# Patient Record
Sex: FEMALE | Race: BLACK OR AFRICAN AMERICAN | Employment: UNEMPLOYED | URBAN - METROPOLITAN AREA
[De-identification: names, ages, dates, MRNs, and addresses within clinical notes are randomized per-mention and may not be internally consistent; named-entity substitution may affect disease eponyms.]

---

## 2017-06-04 ENCOUNTER — HOSPITAL ENCOUNTER (INPATIENT)
Age: 22
LOS: 4 days | Discharge: HOME | DRG: 885 | End: 2017-06-08
Attending: EMERGENCY MEDICINE | Admitting: PSYCHIATRY & NEUROLOGY
Payer: COMMERCIAL

## 2017-06-04 DIAGNOSIS — F30.9 MANIA (HCC): Primary | ICD-10-CM

## 2017-06-04 DIAGNOSIS — F12.90 MARIJUANA USE: ICD-10-CM

## 2017-06-04 PROBLEM — F31.2 BIPOLAR AFFECTIVE DISORDER, MANIC, SEVERE, WITH PSYCHOTIC BEHAVIOR (HCC): Status: ACTIVE | Noted: 2017-06-04

## 2017-06-04 LAB
ALBUMIN SERPL BCP-MCNC: 4 G/DL (ref 3.5–5)
ALBUMIN/GLOB SERPL: 0.9 {RATIO} (ref 1.1–2.2)
ALP SERPL-CCNC: 96 U/L (ref 45–117)
ALT SERPL-CCNC: 17 U/L (ref 12–78)
AMPHET UR QL SCN: NEGATIVE
ANION GAP BLD CALC-SCNC: 12 MMOL/L (ref 5–15)
APAP SERPL-MCNC: <2 UG/ML (ref 10–30)
APPEARANCE UR: CLEAR
AST SERPL W P-5'-P-CCNC: 21 U/L (ref 15–37)
BACTERIA URNS QL MICRO: NEGATIVE /HPF
BARBITURATES UR QL SCN: NEGATIVE
BASOPHILS # BLD AUTO: 0 K/UL (ref 0–0.1)
BASOPHILS # BLD: 0 % (ref 0–1)
BENZODIAZ UR QL: NEGATIVE
BILIRUB SERPL-MCNC: 0.3 MG/DL (ref 0.2–1)
BILIRUB UR QL: NEGATIVE
BUN SERPL-MCNC: 7 MG/DL (ref 6–20)
BUN/CREAT SERPL: 10 (ref 12–20)
CALCIUM SERPL-MCNC: 9 MG/DL (ref 8.5–10.1)
CANNABINOIDS UR QL SCN: POSITIVE
CHLORIDE SERPL-SCNC: 103 MMOL/L (ref 97–108)
CO2 SERPL-SCNC: 25 MMOL/L (ref 21–32)
COCAINE UR QL SCN: NEGATIVE
COLOR UR: ABNORMAL
CREAT SERPL-MCNC: 0.67 MG/DL (ref 0.55–1.02)
DRUG SCRN COMMENT,DRGCM: ABNORMAL
EOSINOPHIL # BLD: 0 K/UL (ref 0–0.4)
EOSINOPHIL NFR BLD: 0 % (ref 0–7)
EPITH CASTS URNS QL MICRO: ABNORMAL /LPF
ERYTHROCYTE [DISTWIDTH] IN BLOOD BY AUTOMATED COUNT: 13.8 % (ref 11.5–14.5)
ETHANOL SERPL-MCNC: <10 MG/DL
GLOBULIN SER CALC-MCNC: 4.6 G/DL (ref 2–4)
GLUCOSE SERPL-MCNC: 94 MG/DL (ref 65–100)
GLUCOSE UR STRIP.AUTO-MCNC: NEGATIVE MG/DL
HCG SERPL QL: NEGATIVE
HCT VFR BLD AUTO: 37 % (ref 35–47)
HGB BLD-MCNC: 11.9 G/DL (ref 11.5–16)
HGB UR QL STRIP: NEGATIVE
KETONES UR QL STRIP.AUTO: NEGATIVE MG/DL
LEUKOCYTE ESTERASE UR QL STRIP.AUTO: ABNORMAL
LYMPHOCYTES # BLD AUTO: 30 % (ref 12–49)
LYMPHOCYTES # BLD: 3.2 K/UL (ref 0.8–3.5)
MCH RBC QN AUTO: 24.3 PG (ref 26–34)
MCHC RBC AUTO-ENTMCNC: 32.2 G/DL (ref 30–36.5)
MCV RBC AUTO: 75.7 FL (ref 80–99)
METHADONE UR QL: NEGATIVE
MONOCYTES # BLD: 0.7 K/UL (ref 0–1)
MONOCYTES NFR BLD AUTO: 7 % (ref 5–13)
NEUTS SEG # BLD: 6.5 K/UL (ref 1.8–8)
NEUTS SEG NFR BLD AUTO: 63 % (ref 32–75)
NITRITE UR QL STRIP.AUTO: NEGATIVE
OPIATES UR QL: NEGATIVE
PCP UR QL: NEGATIVE
PH UR STRIP: 6 [PH] (ref 5–8)
PLATELET # BLD AUTO: 354 K/UL (ref 150–400)
POTASSIUM SERPL-SCNC: 3.8 MMOL/L (ref 3.5–5.1)
PROT SERPL-MCNC: 8.6 G/DL (ref 6.4–8.2)
PROT UR STRIP-MCNC: NEGATIVE MG/DL
RBC # BLD AUTO: 4.89 M/UL (ref 3.8–5.2)
RBC #/AREA URNS HPF: ABNORMAL /HPF (ref 0–5)
SALICYLATES SERPL-MCNC: 1.8 MG/DL (ref 2.8–20)
SODIUM SERPL-SCNC: 140 MMOL/L (ref 136–145)
SP GR UR REFRACTOMETRY: <1.005 (ref 1–1.03)
UA: UC IF INDICATED,UAUC: ABNORMAL
UROBILINOGEN UR QL STRIP.AUTO: 0.2 EU/DL (ref 0.2–1)
WBC # BLD AUTO: 10.4 K/UL (ref 3.6–11)
WBC URNS QL MICRO: ABNORMAL /HPF (ref 0–4)

## 2017-06-04 PROCEDURE — 80307 DRUG TEST PRSMV CHEM ANLYZR: CPT | Performed by: EMERGENCY MEDICINE

## 2017-06-04 PROCEDURE — 80053 COMPREHEN METABOLIC PANEL: CPT | Performed by: EMERGENCY MEDICINE

## 2017-06-04 PROCEDURE — 74011250636 HC RX REV CODE- 250/636: Performed by: EMERGENCY MEDICINE

## 2017-06-04 PROCEDURE — 81001 URINALYSIS AUTO W/SCOPE: CPT | Performed by: EMERGENCY MEDICINE

## 2017-06-04 PROCEDURE — 87491 CHLMYD TRACH DNA AMP PROBE: CPT | Performed by: EMERGENCY MEDICINE

## 2017-06-04 PROCEDURE — 96360 HYDRATION IV INFUSION INIT: CPT

## 2017-06-04 PROCEDURE — 51701 INSERT BLADDER CATHETER: CPT

## 2017-06-04 PROCEDURE — 85025 COMPLETE CBC W/AUTO DIFF WBC: CPT | Performed by: EMERGENCY MEDICINE

## 2017-06-04 PROCEDURE — 65220000003 HC RM SEMIPRIVATE PSYCH

## 2017-06-04 PROCEDURE — 77030005563 HC CATH URETH INT MMGH -A

## 2017-06-04 PROCEDURE — 99285 EMERGENCY DEPT VISIT HI MDM: CPT

## 2017-06-04 PROCEDURE — 84703 CHORIONIC GONADOTROPIN ASSAY: CPT | Performed by: EMERGENCY MEDICINE

## 2017-06-04 PROCEDURE — 36415 COLL VENOUS BLD VENIPUNCTURE: CPT | Performed by: EMERGENCY MEDICINE

## 2017-06-04 RX ORDER — IBUPROFEN 200 MG
1 TABLET ORAL
Status: DISCONTINUED | OUTPATIENT
Start: 2017-06-04 | End: 2017-06-08 | Stop reason: HOSPADM

## 2017-06-04 RX ORDER — ACETAMINOPHEN 325 MG/1
650 TABLET ORAL
Status: DISCONTINUED | OUTPATIENT
Start: 2017-06-04 | End: 2017-06-08 | Stop reason: HOSPADM

## 2017-06-04 RX ORDER — ADHESIVE BANDAGE
30 BANDAGE TOPICAL DAILY PRN
Status: DISCONTINUED | OUTPATIENT
Start: 2017-06-04 | End: 2017-06-08 | Stop reason: HOSPADM

## 2017-06-04 RX ORDER — OLANZAPINE 5 MG/1
5 TABLET ORAL
Status: DISCONTINUED | OUTPATIENT
Start: 2017-06-04 | End: 2017-06-08 | Stop reason: HOSPADM

## 2017-06-04 RX ORDER — BENZTROPINE MESYLATE 1 MG/ML
2 INJECTION INTRAMUSCULAR; INTRAVENOUS
Status: DISCONTINUED | OUTPATIENT
Start: 2017-06-04 | End: 2017-06-08 | Stop reason: HOSPADM

## 2017-06-04 RX ORDER — BENZTROPINE MESYLATE 2 MG/1
2 TABLET ORAL
Status: DISCONTINUED | OUTPATIENT
Start: 2017-06-04 | End: 2017-06-08 | Stop reason: HOSPADM

## 2017-06-04 RX ORDER — LORAZEPAM 2 MG/ML
2 INJECTION INTRAMUSCULAR
Status: DISCONTINUED | OUTPATIENT
Start: 2017-06-04 | End: 2017-06-08 | Stop reason: HOSPADM

## 2017-06-04 RX ORDER — CLONIDINE HYDROCHLORIDE 0.1 MG/1
0.2 TABLET ORAL
Status: ACTIVE | OUTPATIENT
Start: 2017-06-04 | End: 2017-06-04

## 2017-06-04 RX ORDER — IBUPROFEN 400 MG/1
400 TABLET ORAL
Status: DISCONTINUED | OUTPATIENT
Start: 2017-06-04 | End: 2017-06-06

## 2017-06-04 RX ORDER — ZOLPIDEM TARTRATE 10 MG/1
10 TABLET ORAL
Status: DISCONTINUED | OUTPATIENT
Start: 2017-06-04 | End: 2017-06-08 | Stop reason: HOSPADM

## 2017-06-04 RX ORDER — LORAZEPAM 1 MG/1
1 TABLET ORAL
Status: DISCONTINUED | OUTPATIENT
Start: 2017-06-04 | End: 2017-06-08 | Stop reason: HOSPADM

## 2017-06-04 RX ADMIN — SODIUM CHLORIDE 1000 ML: 900 INJECTION, SOLUTION INTRAVENOUS at 09:02

## 2017-06-04 NOTE — IP AVS SNAPSHOT
Current Discharge Medication List  
  
START taking these medications Dose & Instructions Dispensing Information Comments Morning Noon Evening Bedtime  
 lithium carbonate  mg CR tablet Commonly known as:  ESKALITH CR Your last dose was: Your next dose is:    
   
   
 Dose:  900 mg Take 2 Tabs by mouth nightly. Indications: BIPOLAR DISORDER Quantity:  14 Tab Refills:  1  
     
   
   
   
  
 risperiDONE 4 mg tablet Commonly known as:  RisperDAL Your last dose was: Your next dose is:    
   
   
 Dose:  4 mg Take 1 Tab by mouth nightly. Indications: Anne associated with Bipolar Disorder Quantity:  7 Tab Refills:  1 Where to Get Your Medications Information on where to get these meds will be given to you by the nurse or doctor. ! Ask your nurse or doctor about these medications  
  lithium carbonate  mg CR tablet  
 risperiDONE 4 mg tablet

## 2017-06-04 NOTE — IP AVS SNAPSHOT
Summary of Care Report The Summary of Care report has been created to help improve care coordination. Users with access to [employer] or [address] (Web-based application) may access additional patient information including the Discharge Summary. If you are not currently a [address] user and need more information, please call the number listed below in the [place] [address] section and ask to be connected with Medical Records. Facility Information Name Address Phone [address] [place] [id number] [phone] Patient Information Patient Name Sex ROSENDO Sepulveda ([id number]) Female 1995 Discharge Information Admitting Provider Service Area Unit Dedrick Bobby MD / [phone] [id number] [address] [facility] / [phone] Discharge Provider Discharge Date/Time Discharge Disposition Destination (none) 2017 Midday (Pending) AHR (none) Patient Language Language ENGLISH [13] Hospital Problems as of 2017  Reviewed: 2015  2:53 PM by Ca Lu NP Class Noted - Resolved Last Modified POA Active Problems * (Principal)Bipolar affective disorder, manic, severe, with psychotic behavior ([facility] [address]  2017 - Present 2017 by Polo Laurent MD Unknown Entered by Dedrick Bobby MD  
  Marijuana dependence ([facility] [address]  2017 - Present 2017 by Polo Laurent MD Unknown Entered by Polo Laurent MD  
  Non morbid obesity due to excess calories  2017 - Present 2017 by Polo Laurent MD Unknown Entered by Polo Laurent MD  
  
Non-Hospital Problems as of 2017  Reviewed: 2015  2:53 PM by Ca Lu NP Class Noted - Resolved Last Modified Active Problems   Acne  2011 - Present 2011 by Adam Berger NP  
 Entered by Shreya South NP General counseling and advice for contraceptive management  6/21/2011 - Present 6/21/2011 by Shreya South NP Entered by Shreya South NP You are allergic to the following Allergen Reactions Peanut Hives Current Discharge Medication List  
  
START taking these medications Dose & Instructions Dispensing Information Comments  
 lithium carbonate  mg CR tablet Commonly known as:  ESKALITH CR Dose:  900 mg Take 2 Tabs by mouth nightly. Indications: BIPOLAR DISORDER Quantity:  14 Tab Refills:  1  
   
 risperiDONE 4 mg tablet Commonly known as:  RisperDAL Dose:  4 mg Take 1 Tab by mouth nightly. Indications: Anne associated with Bipolar Disorder Quantity:  7 Tab Refills:  1 Current Immunizations Name Date Influenza Vaccine Split 10/17/2011 Follow-up Information Follow up With Details Comments Contact Info None   None (395) Patient stated that they have no PCP 
  
 [address] appointment 6/8/17 @ 8:30AM-2PM for mental health services  [address] [place],[address] [address] [phone] Discharge Instructions DISCHARGE SUMMARY from Nurse The following personal items are in your possession at time of discharge: 
 
Dental Appliances: None Visual Aid: None Home Medications: None Jewelry: None Clothing: [place] Damaris Other Valuables: None PATIENT INSTRUCTIONS: 
 
 
 
 
What to do at Home: 
Recommended activity: Activity as tolerated, If I feel that I can not keep these promises and I am at risk of hurting myself or others, I will call the crisis office and speak with a crisis worker who will assist me during my crisis. [address] [place] [address]  [phone] [address] [place] [address] 19   [phone] Rigoberto Mandujano [facility] [place]  [phone] [facility]  [phone] *  Please give a list of your current medications to your Primary Care Provider. *  Please update this list whenever your medications are discontinued, doses are 
    changed, or new medications (including over-the-counter products) are added. *  Please carry medication information at all times in case of emergency situations. These are general instructions for a healthy lifestyle: No smoking/ No tobacco products/ Avoid exposure to second hand smoke Surgeon General's Warning:  Quitting smoking now greatly reduces serious risk to your health. Obesity, smoking, and sedentary lifestyle greatly increases your risk for illness A healthy diet, regular physical exercise & weight monitoring are important for maintaining a healthy lifestyle Clarke Iniguez Recognize signs and symptoms of STROKE: 
 
F-face looks uneven A-arms unable to move or move unevenly S-speech slurred or non-existent T-time-call 911 as soon as signs and symptoms begin-DO NOT go Back to bed or wait to see if you get better-TIME IS BRAIN. Warning Signs of HEART ATTACK Call 911 if you have these symptoms: 
? Chest discomfort. Most heart attacks involve discomfort in the center of the chest that lasts more than a few minutes, or that goes away and comes back. It can feel like uncomfortable pressure, squeezing, fullness, or pain. ? Discomfort in other areas of the upper body. Symptoms can include pain or discomfort in one or both arms, the back, neck, jaw, or stomach. ? Shortness of breath with or without chest discomfort. ? Other signs may include breaking out in a cold sweat, nausea, or lightheadedness. Don't wait more than five minutes to call [address]! Fast action can save your life. Calling 911 is almost always the fastest way to get lifesaving treatment. Emergency Medical Services staff can begin treatment when they arrive  up to an hour sooner than if someone gets to the hospital by car. The discharge information has been reviewed with the patient. The patient verbalized understanding. Discharge medications reviewed with the patient and appropriate educational materials and side effects teaching were provided. Chart Review Routing History No Routing History on File

## 2017-06-04 NOTE — ED NOTES
Pt states she is unable to provide urine sample because \"it does not feel natural\". Provider notified.
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
## 2017-06-04 NOTE — ROUTINE PROCESS
TRANSFER - OUT REPORT:    Verbal report given to Julia Silva RN(name) on Verito Carrillo  being transferred to Behavioral Health(unit) for routine progression of care       Report consisted of patients Situation, Background, Assessment and   Recommendations(SBAR). Information from the following report(s) SBAR, ED Summary, MAR, Recent Results and Med Rec Status was reviewed with the receiving nurse. Lines:       Opportunity for questions and clarification was provided.       Patient transported with:   Security, TDO, clothing

## 2017-06-04 NOTE — BH NOTES
Pt was admitted to the unit under a TDO for the professional services of Dr. Lucas Kam. Pt presents with loose associations and flight of ideas. She is also tangential, therefore she is unable to answer questions to complete the required documentation. Per report, pt is exhibiting matnic behavior, responding to internal stimuli and preoccupied. Pt also reports that her appetite and sleep have been poor. Pt kept repeating her friend buys \"triple C's\". Pt UDS is postive for THC and her BAL <10. In the ED pt blood pressure was high and was give clonidine. Pt skin assessment ad search completed by Micheal Polo RN and Ko Mancini RN. Skin assessment unremarkable. Pt was oriented to the unit and given lunch. Orders received from MD.  Will monitor pt q15 minutes for safety and support.

## 2017-06-04 NOTE — IP AVS SNAPSHOT
Hilary Letty 
 
 
 [address] Patient: Alexx Stone MRN: [id number] :1995 You are allergic to the following Allergen Reactions Peanut Hives Recent Documentation Height Weight Breastfeeding? BMI OB Status Smoking Status 1.524 m 95.3 kg No 41.01 kg/m2 Having regular periods Current Some Day Smoker About your hospitalization You were admitted on:  2017 You last received care in the:  [place] [address] You were discharged on:  2017 Unit phone number:  [phone] Why you were hospitalized Your primary diagnosis was:  Bipolar Affective Disorder, Manic, Severe, With Psychotic Behavior (Hcc) Your diagnoses also included:  Marijuana Dependence (Hcc), Non Morbid Obesity Due To Excess Calories Providers Seen During Your Hospitalizations Provider Role Specialty Primary office phone Mayte Castillo MD Attending Provider Emergency Medicine [phone] Raúl Haider MD Attending Provider Psychiatry [phone] Your Primary Care Physician (PCP) Primary Care Physician Office Phone Office Fax NONE ** None ** ** None ** Follow-up Information Follow up With Details Comments Contact Info None   None ([phone]) Patient stated that they have no PCP 
  
 [address] [facility] appointment 17 @ 8:30AM-2PM for mental health services  [facility] [address] [address] [phone] Current Discharge Medication List  
  
START taking these medications Dose & Instructions Dispensing Information Comments Morning Noon Evening Bedtime  
 lithium carbonate  mg CR tablet Commonly known as:  ESKALITH CR Your last dose was: Your next dose is:    
   
   
 Dose:  900 mg Take 2 Tabs by mouth nightly. Indications: BIPOLAR DISORDER Quantity:  14 Tab Refills:  1  
     
   
   
   
  
 risperiDONE 4 mg tablet Commonly known as:  RisperDAL Your last dose was: Your next dose is:    
   
   
 Dose:  4 mg Take 1 Tab by mouth nightly. Indications: Anne associated with Bipolar Disorder Quantity:  7 Tab Refills:  1 Where to Get Your Medications Information on where to get these meds will be given to you by the nurse or doctor. ! Ask your nurse or doctor about these medications  
  lithium carbonate  mg CR tablet  
 risperiDONE 4 mg tablet Discharge Instructions DISCHARGE SUMMARY from Nurse The following personal items are in your possession at time of discharge: 
 
Dental Appliances: None Visual Aid: None Home Medications: None Jewelry: None Clothing: Cozetta Hugger Other Valuables: None PATIENT INSTRUCTIONS: 
 
 
 
 
What to do at Home: 
Recommended activity: Activity as tolerated, If I feel that I can not keep these promises and I am at risk of hurting myself or others, I will call the crisis office and speak with a crisis worker who will assist me during my crisis. [address] [place] [address]  [phone] [address] [place] [address]   [phone] [facility] Crisis  [phone] [facility] Crisis  [phone] *  Please give a list of your current medications to your Primary Care Provider. *  Please update this list whenever your medications are discontinued, doses are 
    changed, or new medications (including over-the-counter products) are added. *  Please carry medication information at all times in case of emergency situations. These are general instructions for a healthy lifestyle: No smoking/ No tobacco products/ Avoid exposure to second hand smoke Surgeon General's Warning:  Quitting smoking now greatly reduces serious risk to your health. Obesity, smoking, and sedentary lifestyle greatly increases your risk for illness A healthy diet, regular physical exercise & weight monitoring are important for maintaining a healthy lifestyle Sae Billingsley Recognize signs and symptoms of STROKE: 
 
F-face looks uneven A-arms unable to move or move unevenly S-speech slurred or non-existent T-time-call 911 as soon as signs and symptoms begin-DO NOT go Back to bed or wait to see if you get better-TIME IS BRAIN. Warning Signs of HEART ATTACK Call 911 if you have these symptoms: 
? Chest discomfort. Most heart attacks involve discomfort in the center of the chest that lasts more than a few minutes, or that goes away and comes back. It can feel like uncomfortable pressure, squeezing, fullness, or pain. ? Discomfort in other areas of the upper body. Symptoms can include pain or discomfort in one or both arms, the back, neck, jaw, or stomach. ? Shortness of breath with or without chest discomfort. ? Other signs may include breaking out in a cold sweat, nausea, or lightheadedness. Don't wait more than five minutes to call [address]! Fast action can save your life. Calling 911 is almost always the fastest way to get lifesaving treatment. Emergency Medical Services staff can begin treatment when they arrive  up to an hour sooner than if someone gets to the hospital by car. The discharge information has been reviewed with the patient. The patient verbalized understanding. Discharge medications reviewed with the patient and appropriate educational materials and side effects teaching were provided. Discharge Orders None [facility] Announcement We are excited to announce that we are making your provider's discharge notes available to you in [facility].   You will see these notes when they are completed and signed by the physician that discharged you from your recent hospital stay. If you have any questions or concerns about any information you see in [employer], please call the Health Information Department where you were seen or reach out to your Primary Care Provider for more information about your plan of care. Introducing [facility]! [facility] introduces [employer] patient portal. Now you can access parts of your medical record, email your doctor's office, and request medication refills online. 1. In your internet browser, go to [email or web address] 2. Click on the First Time User? Click Here link in the Sign In box. You will see the New Member Sign Up page. 3. Enter your [employer] Access Code exactly as it appears below. You will not need to use this code after youve completed the sign-up process. If you do not sign up before the expiration date, you must request a new code. · [employer] Access Code: [email or web address] Expires: 9/6/2017 12:02 PM 
 
4. Enter the last four digits of your Social Security Number (xxxx) and Date of Birth (mm/dd/yyyy) as indicated and click Submit. You will be taken to the next sign-up page. 5. Create a [employer] ID. This will be your [employer] login ID and cannot be changed, so think of one that is secure and easy to remember. 6. Create a [employer] password. You can change your password at any time. 7. Enter your Password Reset Question and Answer. This can be used at a later time if you forget your password. 8. Enter your e-mail address. You will receive e-mail notification when new information is available in [address] 9. Click Sign Up. You can now view and download portions of your medical record. 10. Click the Download Summary menu link to download a portable copy of your medical information. If you have questions, please visit the Frequently Asked Questions section of the [employer] website. Remember, [employer] is NOT to be used for urgent needs. For medical emergencies, dial 911. Now available from your iPhone and Android! General Information Please provide this summary of care documentation to your next provider. Patient Signature:  ____________________________________________________________ Date:  ____________________________________________________________  
  
[facility] Provider Signature:  ____________________________________________________________ Date:  ____________________________________________________________

## 2017-06-04 NOTE — ED NOTES
IV fluids started on patient.  Pt is tearful and states \"I want to sleep but I can't, I have a plan to do things, and I can't\"
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
## 2017-06-05 PROBLEM — F12.20 MARIJUANA DEPENDENCE (HCC): Status: ACTIVE | Noted: 2017-06-05

## 2017-06-05 PROBLEM — E66.09 NON MORBID OBESITY DUE TO EXCESS CALORIES: Status: ACTIVE | Noted: 2017-06-05

## 2017-06-05 LAB
C TRACH DNA SPEC QL NAA+PROBE: POSITIVE
N GONORRHOEA DNA SPEC QL NAA+PROBE: NEGATIVE
SAMPLE TYPE: ABNORMAL
SERVICE CMNT-IMP: ABNORMAL
SPECIMEN SOURCE: ABNORMAL

## 2017-06-05 PROCEDURE — 74011250637 HC RX REV CODE- 250/637: Performed by: PSYCHIATRY & NEUROLOGY

## 2017-06-05 PROCEDURE — 65220000003 HC RM SEMIPRIVATE PSYCH

## 2017-06-05 RX ORDER — RISPERIDONE 1 MG/1
1 TABLET, FILM COATED ORAL 2 TIMES DAILY
Status: DISCONTINUED | OUTPATIENT
Start: 2017-06-05 | End: 2017-06-06

## 2017-06-05 RX ORDER — DIVALPROEX SODIUM 500 MG/1
1500 TABLET, EXTENDED RELEASE ORAL
Status: DISCONTINUED | OUTPATIENT
Start: 2017-06-05 | End: 2017-06-06

## 2017-06-05 RX ADMIN — ZOLPIDEM TARTRATE 10 MG: 10 TABLET, FILM COATED ORAL at 21:23

## 2017-06-05 RX ADMIN — ZOLPIDEM TARTRATE 10 MG: 10 TABLET, FILM COATED ORAL at 00:48

## 2017-06-05 RX ADMIN — RISPERIDONE 1 MG: 1 TABLET ORAL at 17:20

## 2017-06-05 RX ADMIN — RISPERIDONE 1 MG: 1 TABLET ORAL at 12:23

## 2017-06-05 RX ADMIN — DIVALPROEX SODIUM 1500 MG: 500 TABLET, FILM COATED, EXTENDED RELEASE ORAL at 21:23

## 2017-06-05 NOTE — BH NOTES
Patient was observed to be sleeping for 7 hours without any distress and even respirations. Q 15 minutes monitoring maintained for the safety and support. Cooperative with blood drawn for the labs.

## 2017-06-05 NOTE — BH NOTES
Pt complaint with med's and meal's attend to group Pt get a long with her peer's .  Pt behavioral it was good didn't have no problem
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
## 2017-06-05 NOTE — PROGRESS NOTES
Laboratory monitoring for mood stabilizer and antipsychotics:    Recommended baseline monitoring has been completed based on this patient's current medication regimen. The patient is currently taking the following medication(s):   Current Facility-Administered Medications   Medication Dose Route Frequency    risperiDONE (RisperDAL) tablet 2 mg  2 mg Oral BID    lithium carbonate CR (ESKALITH CR) tablet 450 mg  450 mg Oral BID       Height, Weight, BMI Estimation  Estimated body mass index is 41.01 kg/(m^2) as calculated from the following:    Height as of this encounter: 152.4 cm (60\"). Weight as of this encounter: 95.3 kg (210 lb). Renal Function, Hepatic Function and Chemistry  Estimated Creatinine Clearance: 138.3 mL/min (based on Cr of 0.67). Lab Results   Component Value Date/Time    Sodium 140 06/04/2017 07:23 AM    Potassium 3.8 06/04/2017 07:23 AM    Chloride 103 06/04/2017 07:23 AM    CO2 25 06/04/2017 07:23 AM    Anion gap 12 06/04/2017 07:23 AM    Glucose 94 06/04/2017 07:23 AM    BUN 7 06/04/2017 07:23 AM    Creatinine 0.67 06/04/2017 07:23 AM    BUN/Creatinine ratio 10 06/04/2017 07:23 AM    GFR est AA >60 06/04/2017 07:23 AM    GFR est non-AA >60 06/04/2017 07:23 AM    Calcium 9.0 06/04/2017 07:23 AM    ALT (SGPT) 17 06/04/2017 07:23 AM    AST (SGOT) 21 06/04/2017 07:23 AM    Alk.  phosphatase 96 06/04/2017 07:23 AM    Protein, total 8.6 06/04/2017 07:23 AM    Albumin 4.0 06/04/2017 07:23 AM    Globulin 4.6 06/04/2017 07:23 AM    A-G Ratio 0.9 06/04/2017 07:23 AM    Bilirubin, total 0.3 06/04/2017 07:23 AM       Lab Results   Component Value Date/Time    Glucose 94 06/04/2017 07:23 AM     Hematology  Lab Results   Component Value Date/Time    WBC 10.4 06/04/2017 07:23 AM    HGB 11.9 06/04/2017 07:23 AM    HCT 37.0 06/04/2017 07:23 AM    PLATELET 705 39/29/6776 07:23 AM    MCV 75.7 06/04/2017 07:23 AM       Lipids  Lab Results   Component Value Date/Time    Cholesterol, total 146 06/08/2017 05:06 AM    HDL Cholesterol 74 06/08/2017 05:06 AM    LDL, calculated 59.2 06/08/2017 05:06 AM    Triglyceride 64 06/08/2017 05:06 AM    CHOL/HDL Ratio 2.0 06/08/2017 05:06 AM       Thyroid Function    Lab Results   Component Value Date/Time    TSH 2.98 06/08/2017 05:06 AM     Vitals  Visit Vitals    /83    Pulse (!) 105    Temp 97.6 °F (36.4 °C)    Resp 18    Ht 152.4 cm (60\")    Wt 95.3 kg (210 lb)    SpO2 100%    Breastfeeding No    BMI 41.01 kg/m2       Pregnancy Test  Lab Results   Component Value Date/Time    HCG, Ql. NEGATIVE  06/04/2017 07:23 AM    HCG urine, Ql. (POC) Negative 12/14/2011 03:09 PM       Christine Pollard, PharmD, BCPS  [phone] (pharmacy)

## 2017-06-05 NOTE — CONSULTS
Medical Consult for [facility] Patient    Consult H&P   dictated, see patient chart    Impression:    Shey Gutiérrez a 22 y.o. female with past medical history of mental health disease and substance abuse presents with behavioral health problems of psychosis admitted for further psychiatric evaluation and treatment. Plan:   1. Psychiatry to manage mental health issues. 2. Medically stable at this time, will follow up as needed. 3. No VTE prophylaxis indicated or necessary at this time.      Thank you  Ana Laura Soto MD  6/5/2017, 7:06 AM

## 2017-06-05 NOTE — H&P
INITIAL PSYCHIATRIC EVALUATION         IDENTIFICATION:    Patient Name  Gianna Mallory   Date of Birth 1995   [facility] [id number]   Medical Record Number  [id number]      Age  22 y.o. PCP None   Admit date:  6/4/2017    Room Number  347/30  @ [facility]   Date of Service  6/5/2017            HISTORY         REASON FOR HOSPITALIZATION:  CC: \"manic and psychotic\". Pt admitted under a temporary prison order (TDO) for severe psychosis and tammi proving to be/posing an imminent danger to self and others and an inability to care for self. HISTORY OF PRESENT ILLNESS:    The patient, Gianna Mallory, is a 22 y.o. BLACK OR  female with a past psychiatric history significant for bipolar dis, who presents at this time with complaints of (and/or evidence of) the following emotional symptoms: agitation, psychotic behavior and tammi. Additional symptomatology include disorganization of thought processes, delusions (somatic, paranoid), bizarre behaviors, denial and insomnia. The above symptoms have been present for a few weeks. These symptoms are of severe severity. These symptoms are constant in nature. The patient's condition has been precipitated by psychosocial stressors (kicked out of mother's home, no job ). Patient's condition made worse by continued illicit drug use as well as treatment noncompliance. UDS: +MJ . BAL=0. ALLERGIES:  Allergies   Allergen Reactions    Peanut Hives      MEDICATIONS PRIOR TO ADMISSION:  No prescriptions prior to admission. PAST MEDICAL HISTORY:  Past Medical History:   Diagnosis Date    Ankle fracture     Bipolar disorder ([facility] [address]    History reviewed. No pertinent surgical history. SOCIAL HISTORY:    Social History     Social History    Marital status: SINGLE     Spouse name: N/A    Number of children: N/A    Years of education: N/A     Occupational History    Not on file.      Social History Main Topics    Smoking status: Current Some Day Smoker     Types: Cigarettes    Smokeless tobacco: Never Used    Alcohol use Yes      Comment: socially    Drug use: Yes     Special: Marijuana      Comment: \"Triple C\" (coricedin)    Sexual activity: No     Other Topics Concern    Not on file     Social History Narrative    Single, no children. Uses [employer]. Homeless, kicked out of mother's house. HS grad. No job---last one in 2015. [employer]. H/o arrests x 1. FAMILY HISTORY:   Family History   Problem Relation Age of Onset    Bipolar Disorder Mother        REVIEW OF SYSTEMS:   Psychological ROS: positive for - irritability and mood swings  negative for - suicidal ideation  Pertinent items are noted in the History of Present Illness. All other Systems reviewed and are considered negative. MENTAL STATUS EXAM & VITALS         MENTAL STATUS EXAM (MSE):    MSE FINDINGS ARE WITHIN NORMAL LIMITS (WNL) UNLESS OTHERWISE STATED BELOW. ( ALL OF THE BELOW CATEGORIES OF THE MSE HAVE BEEN REVIEWED (reviewed 6/5/2017) AND UPDATED AS DEEMED APPROPRIATE )  General Presentation age appropriate and overweight, unreliable   Orientation disorganized, not oriented to situation, oriented to time, place and person   Vital Signs  See below (reviewed 6/5/2017); Vital Signs (BP, Pulse, & Temp) are within normal limits if not listed below.    Gait and Station Stable/steady, no ataxia   Musculoskeletal System No extrapyramidal symptoms (EPS); no abnormal muscular movements or Tardive Dyskinesia (TD); muscle strength and tone are within normal limits   Language No aphasia or dysarthria   Speech:  normal pitch and normal volume   Thought Processes illogical; slow rate of thoughts; poor abstract reasoning/computation   Thought Associations flight of ideas and tangential   Thought Content paranoid delusions, bizarre delusions and somatic delusions   Suicidal Ideations none   Homicidal Ideations none   Mood:  euphoric, irritable and labile    Affect:  euthymic and labile   Memory recent  impaired   Memory remote:  impaired   Concentration/Attention:  distractable   Fund of Knowledge average   Insight:  poor   Reliability poor   Judgment:  poor            VITALS:     No data found. Wt Readings from Last 3 Encounters:   06/04/17 95.3 kg (210 lb)   09/01/15 89.4 kg (197 lb) (97 %, Z= 1.91)*   08/05/13 89.8 kg (98 %, Z= 2.00)*     * Growth percentiles are based on [facility] 2-20 Years data.      Temp Readings from Last 3 Encounters:   06/04/17 98.1 °F (36.7 °C)   09/01/15 98.3 °F (36.8 °C)   12/12/11 98 °F (36.7 °C) (Oral)     BP Readings from Last 3 Encounters:   06/04/17 144/89   09/01/15 102/72   08/05/13 135/79     Pulse Readings from Last 3 Encounters:   06/04/17 71   09/01/15 75   08/05/13 68            DATA       LABORATORY DATA:  Labs Reviewed   CBC WITH AUTOMATED DIFF - Abnormal; Notable for the following:        Result Value    MCV 75.7 (*)     MCH 24.3 (*)     All other components within normal limits   METABOLIC PANEL, COMPREHENSIVE - Abnormal; Notable for the following:     BUN/Creatinine ratio 10 (*)     Protein, total 8.6 (*)     Globulin 4.6 (*)     A-G Ratio 0.9 (*)     All other components within normal limits   DRUG SCREEN, URINE - Abnormal; Notable for the following:     THC (TH-CANNABINOL) POSITIVE (*)     All other components within normal limits   URINALYSIS W/ REFLEX CULTURE - Abnormal; Notable for the following:     Leukocyte Esterase TRACE (*)     All other components within normal limits   ACETAMINOPHEN - Abnormal; Notable for the following:     Acetaminophen level <2 (*)     All other components within normal limits   SALICYLATE - Abnormal; Notable for the following:     SALICYLATE 1.8 (*)     All other components within normal limits   ETHYL ALCOHOL   HCG QL SERUM   CHLAMYDIA / GC AMPLIFICATION     Admission on 06/04/2017   Component Date Value Ref Range Status    WBC 06/04/2017 10.4  3.6 - 11.0 K/uL Final    RBC 06/04/2017 4.89  3.80 - 5.20 M/uL Final    HGB 06/04/2017 11.9  11.5 - 16.0 g/dL Final    HCT 06/04/2017 37.0  35.0 - 47.0 % Final    MCV 06/04/2017 75.7* 80.0 - 99.0 FL Final    MCH 06/04/2017 24.3* 26.0 - 34.0 PG Final    MCHC 06/04/2017 32.2  30.0 - 36.5 g/dL Final    RDW 06/04/2017 13.8  11.5 - 14.5 % Final    PLATELET 21/26/7089 228  150 - 400 K/uL Final    NEUTROPHILS 06/04/2017 63  32 - 75 % Final    LYMPHOCYTES 06/04/2017 30  12 - 49 % Final    MONOCYTES 06/04/2017 7  5 - 13 % Final    EOSINOPHILS 06/04/2017 0  0 - 7 % Final    BASOPHILS 06/04/2017 0  0 - 1 % Final    ABS. NEUTROPHILS 06/04/2017 6.5  1.8 - 8.0 K/UL Final    ABS. LYMPHOCYTES 06/04/2017 3.2  0.8 - 3.5 K/UL Final    ABS. MONOCYTES 06/04/2017 0.7  0.0 - 1.0 K/UL Final    ABS. EOSINOPHILS 06/04/2017 0.0  0.0 - 0.4 K/UL Final    ABS. BASOPHILS 06/04/2017 0.0  0.0 - 0.1 K/UL Final    Sodium 06/04/2017 140  136 - 145 mmol/L Final    Potassium 06/04/2017 3.8  3.5 - 5.1 mmol/L Final    Chloride 06/04/2017 103  97 - 108 mmol/L Final    CO2 06/04/2017 25  21 - 32 mmol/L Final    Anion gap 06/04/2017 12  5 - 15 mmol/L Final    Glucose 06/04/2017 94  65 - 100 mg/dL Final    BUN 06/04/2017 7  6 - 20 MG/DL Final    Creatinine 06/04/2017 0.67  0.55 - 1.02 MG/DL Final    BUN/Creatinine ratio 06/04/2017 10* 12 - 20   Final    GFR est AA 06/04/2017 >60  >60 ml/min/1.73m2 Final    GFR est non-AA 06/04/2017 >60  >60 ml/min/1.73m2 Final    Calcium 06/04/2017 9.0  8.5 - 10.1 MG/DL Final    Bilirubin, total 06/04/2017 0.3  0.2 - 1.0 MG/DL Final    ALT (SGPT) 06/04/2017 17  12 - 78 U/L Final    AST (SGOT) 06/04/2017 21  15 - 37 U/L Final    Alk.  phosphatase 06/04/2017 96  45 - 117 U/L Final    Protein, total 06/04/2017 8.6* 6.4 - 8.2 g/dL Final    Albumin 06/04/2017 4.0  3.5 - 5.0 g/dL Final    Globulin 06/04/2017 4.6* 2.0 - 4.0 g/dL Final    A-G Ratio 06/04/2017 0.9* 1.1 - 2.2   Final    AMPHETAMINE 06/04/2017 NEGATIVE   NEG   Final    BARBITURATES 06/04/2017 NEGATIVE   NEG   Final    BENZODIAZEPINE 06/04/2017 NEGATIVE   NEG   Final    COCAINE 06/04/2017 NEGATIVE   NEG   Final    METHADONE 06/04/2017 NEGATIVE   NEG   Final    OPIATES 06/04/2017 NEGATIVE   NEG   Final    PCP(PHENCYCLIDINE) 06/04/2017 NEGATIVE   NEG   Final    THC (TH-CANNABINOL) 06/04/2017 POSITIVE* NEG   Final    Drug screen comment 06/04/2017 (NOTE)   Final    Color 06/04/2017 YELLOW/STRAW    Final    Appearance 06/04/2017 CLEAR  CLEAR   Final    Specific gravity 06/04/2017 <1.005  1.003 - 1.030 Final    pH (UA) 06/04/2017 6.0  5.0 - 8.0   Final    Protein 06/04/2017 NEGATIVE   NEG mg/dL Final    Glucose 06/04/2017 NEGATIVE   NEG mg/dL Final    Ketone 06/04/2017 NEGATIVE   NEG mg/dL Final    Bilirubin 06/04/2017 NEGATIVE   NEG   Final    Blood 06/04/2017 NEGATIVE   NEG   Final    Urobilinogen 06/04/2017 0.2  0.2 - 1.0 EU/dL Final    Nitrites 06/04/2017 NEGATIVE   NEG   Final    Leukocyte Esterase 06/04/2017 TRACE* NEG   Final    WBC 06/04/2017 0-4  0 - 4 /hpf Final    RBC 06/04/2017 0-5  0 - 5 /hpf Final    Epithelial cells 06/04/2017 FEW  FEW /lpf Final    Bacteria 06/04/2017 NEGATIVE   NEG /hpf Final    UA:UC IF INDICATED 06/04/2017 CULTURE NOT INDICATED BY UA RESULT  CNI   Final    Acetaminophen level 06/04/2017 <2* 10 - 30 ug/mL Final    SALICYLATE 76/67/0089 1.8* 2.8 - 20.0 MG/DL Final    ALCOHOL(ETHYL),SERUM 06/04/2017 <10  <10 MG/DL Final    HCG, Ql. 06/04/2017 NEGATIVE   NEG   Final        RADIOLOGY REPORTS:  No results found for this or any previous visit. No results found.            MEDICATIONS       ALL MEDICATIONS  Current Facility-Administered Medications   Medication Dose Route Frequency    divalproex ER (DEPAKOTE ER) 24 hour tablet 1,500 mg  1,500 mg Oral QHS    risperiDONE (RisperDAL) tablet 1 mg  1 mg Oral BID    ziprasidone (GEODON) 20 mg in sterile water (preservative free) 1 mL injection  20 mg IntraMUSCular BID PRN    OLANZapine (ZyPREXA) tablet 5 mg 5 mg Oral Q6H PRN    benztropine (COGENTIN) tablet 2 mg  2 mg Oral BID PRN    benztropine (COGENTIN) injection 2 mg  2 mg IntraMUSCular BID PRN    LORazepam (ATIVAN) injection 2 mg  2 mg IntraMUSCular Q4H PRN    LORazepam (ATIVAN) tablet 1 mg  1 mg Oral Q4H PRN    zolpidem (AMBIEN) tablet 10 mg  10 mg Oral QHS PRN    acetaminophen (TYLENOL) tablet 650 mg  650 mg Oral Q4H PRN    ibuprofen (MOTRIN) tablet 400 mg  400 mg Oral Q8H PRN    magnesium hydroxide (MILK OF MAGNESIA) 400 mg/5 mL oral suspension 30 mL  30 mL Oral DAILY PRN    nicotine (NICODERM CQ) 21 mg/24 hr patch 1 Patch  1 Patch TransDERmal DAILY PRN      SCHEDULED MEDICATIONS  Current Facility-Administered Medications   Medication Dose Route Frequency    divalproex ER (DEPAKOTE ER) 24 hour tablet 1,500 mg  1,500 mg Oral QHS    risperiDONE (RisperDAL) tablet 1 mg  1 mg Oral BID                ASSESSMENT & PLAN        The patient Carl Stanford is a 22 y.o.  female who presents at this time for treatment of the following diagnoses:  Patient Active Hospital Problem List:   Bipolar affective disorder, manic, severe, with psychotic behavior ([facility] [address] (6/4/2017)    Assessment: severe psychosis and manic features on adm. Made worse by MJ and tx noncompliance issues. Will get old recs from [facility], get collateral from mother    Plan: start bipolar meds. I will continue to monitor blood levels (Depakote---a drug with a narrow therapeutic index= NTI) and associated labs for drug therapy implemented that require intense monitoring for toxicity as deemed appropriate base on current medication side effects and pharmacodynamically determined drug 1/2 lives.    Marijuana dependence ([facility] [address] (6/5/2017)    Assessment: chronic, will make bipolar dis worse    Plan: rehab   Non morbid obesity due to excess calories (6/5/2017)    Assessment: mod    Plan: caution with choice of psych meds          In summary, Carl Stanford presents with a severe exacerbation of the principal diagnosis, Bipolar affective disorder, manic, severe, with psychotic behavior ([address]    While on the unit Abelardo Velez will be provided with individual, milieu, occupational, group, and substance abuse therapies to address target symptoms as deemed appropriate for the individual patient. I agree with decision to admit patient. I have spoken to [facility] psychiatric /ED staff regarding the nature of patients's admission at this time. A coordinated, multidisplinary treatment team (includes the nurse, unit pharmcist,  and writer) round was conducted for this initial evaluation with the patient present. The following regarding medications was addressed during rounds with patient:   the risks and benefits of the proposed medication. The patient was given the opportunity to ask questions. Informed consent given to the use of the above medications. I will continue to adjust psychiatric and non-psychiatric medications (see above \"medication\" section and orders section for details) as deemed appropriate & based upon diagnoses and response to treatment. I have reviewed admission (and previous/old) labs and medical tests in the EHR and or transferring hospital documents. I will continue to order blood tests/labs and diagnostic tests as deemed appropriate and review results as they become available (see orders for details). I have reviewed old psychiatric and medical records available in the EHR. I Will order additional psychiatric records from other institutions to further elucidate the nature of patient's psychopathology and review once available. I will gather additional collateral information from friends, family and o/p treatment team to further elucidate the nature of patient's psychopathology and baselline level of psychiatric functioning.         ESTIMATED LENGTH OF STAY:   9 days       STRENGTHS:  Exercising self-direction/Resourceful and Interpersonal/supportive relationships (family, friends, peers)                      SIGNED:    Diony Izquierdo MD  6/5/2017

## 2017-06-06 PROCEDURE — 74011250636 HC RX REV CODE- 250/636: Performed by: PSYCHIATRY & NEUROLOGY

## 2017-06-06 PROCEDURE — 74011250637 HC RX REV CODE- 250/637: Performed by: PSYCHIATRY & NEUROLOGY

## 2017-06-06 PROCEDURE — 65220000003 HC RM SEMIPRIVATE PSYCH

## 2017-06-06 RX ORDER — RISPERIDONE 1 MG/1
2 TABLET, FILM COATED ORAL 2 TIMES DAILY
Status: DISCONTINUED | OUTPATIENT
Start: 2017-06-06 | End: 2017-06-08 | Stop reason: HOSPADM

## 2017-06-06 RX ORDER — AZITHROMYCIN 250 MG/1
1000 TABLET, FILM COATED ORAL ONCE
Status: COMPLETED | OUTPATIENT
Start: 2017-06-06 | End: 2017-06-06

## 2017-06-06 RX ORDER — LITHIUM CARBONATE 450 MG/1
450 TABLET ORAL 2 TIMES DAILY
Status: DISCONTINUED | OUTPATIENT
Start: 2017-06-06 | End: 2017-06-08 | Stop reason: HOSPADM

## 2017-06-06 RX ADMIN — ACETAMINOPHEN 650 MG: 325 TABLET ORAL at 12:01

## 2017-06-06 RX ADMIN — BENZTROPINE MESYLATE 2 MG: 1 INJECTION INTRAMUSCULAR; INTRAVENOUS at 21:21

## 2017-06-06 RX ADMIN — LITHIUM CARBONATE 450 MG: 450 TABLET, EXTENDED RELEASE ORAL at 17:07

## 2017-06-06 RX ADMIN — ACETAMINOPHEN 325 MG: 325 TABLET ORAL at 17:06

## 2017-06-06 RX ADMIN — RISPERIDONE 2 MG: 1 TABLET ORAL at 08:45

## 2017-06-06 RX ADMIN — RISPERIDONE 2 MG: 1 TABLET ORAL at 17:07

## 2017-06-06 RX ADMIN — LITHIUM CARBONATE 450 MG: 450 TABLET, EXTENDED RELEASE ORAL at 08:55

## 2017-06-06 RX ADMIN — AZITHROMYCIN 1000 MG: 250 TABLET, FILM COATED ORAL at 11:40

## 2017-06-06 NOTE — PROGRESS NOTES
Problem: Altered Thought Process (Adult/Pediatric)  Goal: *STG: Remains safe in hospital  Outcome: Progressing Towards Goal  Patient has isolated in her rom, except for meals and medications. Patient did not attend groups. Compliant with medications, encourage patient to attend groups and participate in treatment team. Continue safety checks Q 15 minutes.

## 2017-06-06 NOTE — BH NOTES
PSYCHIATRIC PROGRESS NOTE         Patient Name  Oleksandr Alonso   Date of Birth 1995   [facility] [id number]   Medical Record Number  [id number]      Age  22 y.o. PCP None   Admit date:  6/4/2017    Room Number  058/14  @ [address]   Date of Service  6/6/2017          PSYCHOTHERAPY SESSION NOTE:  Length of psychotherapy session: 20 minutes    Main condition/diagnosis/issues treated during session today, 6/6/2017 : delusions, denial, confabulation    I employed Cognitive Behavioral therapy techniques, Reality-Oriented psychotherapy, as well as supportive psychotherapy in regards to various ongoing psychosocial stressors, including the following: pre-admission and current problems; housing issues; occupational issues; academic issues; legal issues; medical issues; and stress of hospitalization. Interpersonal relationship issues and psychodynamic conflicts explored. Attempts made to alleviate maladaptive patterns. We, also, worked on issues of denial & effects of substance dependency/use     Overall, patient is not progressing    Treatment Plan Update (reviewed an updated 6/6/2017) : I will modify psychotherapy tx plan by implementing more stress management strategies, building upon cognitive behavioral techniques, increasing coping skills, as well as shoring up psychological defenses). An extended energy and skill set was needed to engage pt in psychotherapy due to some of the following: resistiveness, complexity, negativity, confrontational nature, hostile behaviors, and/or severe abnormalities in thought processes/psychosis resulting in the loss of expressive/receptive language communication skills. E & M PROGRESS NOTE:         HISTORY       CC:  \"i feel a lot better\"  HISTORY OF PRESENT ILLNESS/INTERVAL HISTORY:  (reviewed/updated 6/6/2017). per initial evaluation: The patient, Oleksandr Alonso, is a 22 y.o.   BLACK OR  female with a past psychiatric history significant for bipolar dis, who presents at this time with complaints of (and/or evidence of) the following emotional symptoms: agitation, psychotic behavior and tammi. Additional symptomatology include disorganization of thought processes, delusions (somatic, paranoid), bizarre behaviors, denial and insomnia. The above symptoms have been present for a few weeks. These symptoms are of severe severity. These symptoms are constant in nature. The patient's condition has been precipitated by psychosocial stressors (kicked out of mother's home, no job ). Patient's condition made worse by continued illicit drug use as well as treatment noncompliance. UDS: +MJ . BAL=0.     ght processes, delusions (somatic, paranoid), bizarre behaviors, denial and insomnia. The above symptoms have been present for a few weeks. These symptoms are of severe severity. These symptoms are constant in nature. The patient's condition has been precipitated by psychosocial stressors (kicked out of mother's home, no job ). Patient's condition made worse by continued illicit drug use as wel  Abelardo Perez presents/reports/evidences the following emotional symptoms today, 6/6/2017:tammi and psychosis. The above symptoms have been present for a few months. These symptoms are of severe severity. The symptoms are constant in nature. Additional symptomatology and features include del and confabulation, disorganization of thought processes, poor insight. SIDE EFFECTS: (reviewed/updated 6/6/2017)  None reported or admitted to. No noted toxicity with use of Depakote   ALLERGIES:(reviewed/updated 6/6/2017)  Allergies   Allergen Reactions    Peanut Hives      MEDICATIONS PRIOR TO ADMISSION:(reviewed/updated 6/6/2017)  No prescriptions prior to admission.       PAST MEDICAL HISTORY: Past medical history from the initial psychiatric evaluation has been reviewed (reviewed/updated 6/6/2017) with no additional updates (I asked patient and no additional past medical history provided). Past Medical History:   Diagnosis Date    Ankle fracture     Bipolar disorder ([facility] [address]    History reviewed. No pertinent surgical history. SOCIAL HISTORY: Social history from the initial psychiatric evaluation has been reviewed (reviewed/updated 6/6/2017) with no additional updates (I asked patient and no additional social history provided). Social History     Social History    Marital status: SINGLE     Spouse name: N/A    Number of children: N/A    Years of education: N/A     Occupational History    Not on file. Social History Main Topics    Smoking status: Current Some Day Smoker     Types: Cigarettes    Smokeless tobacco: Never Used    Alcohol use Yes      Comment: socially    Drug use: Yes     Special: Marijuana      Comment: \"Triple C\" (coricedin)    Sexual activity: No     Other Topics Concern    Not on file     Social History Narrative    Single, no children. Uses MJ. Homeless, kicked out of mother's house. HS grad. No job---last one in 2015. [employer]. H/o arrests x 1. FAMILY HISTORY: Family history from the initial psychiatric evaluation has been reviewed (reviewed/updated 6/6/2017) with no additional updates (I asked patient and no additional family history provided).  Family History   Problem Relation Age of Onset    Bipolar Disorder Mother        REVIEW OF SYSTEMS: (reviewed/updated 6/6/2017)  Appetite:improved   Sleep: improved   All other Review of Systems: Respiratory ROS: no cough, shortness of breath, or wheezing  negative  Cardiovascular ROS: no chest pain or dyspnea on exertion  negative  Gastrointestinal ROS: no abdominal pain, change in bowel habits, or black or bloody stools  negative  Neurological ROS: no TIA or stroke symptoms  negative         [facility] ([facility]):    [facility] FINDINGS ARE WITHIN NORMAL LIMITS (WNL) UNLESS OTHERWISE STATED BELOW. ( ALL OF THE BELOW CATEGORIES OF THE MSE HAVE BEEN REVIEWED (reviewed 6/6/2017) AND UPDATED AS DEEMED APPROPRIATE )  General Presentation age appropriate and casually dressed, cooperative and unreliable   Orientation disorganized, not oriented to situation, oriented to time, place and person   Vital Signs  See below (reviewed 6/6/2017); Vital Signs (BP, Pulse, & Temp) are within normal limits if not listed below. Gait and Station Stable/steady, no ataxia   Musculoskeletal System No extrapyramidal symptoms (EPS); no abnormal muscular movements or Tardive Dyskinesia (TD); muscle strength and tone are within normal limits   Language No aphasia or dysarthria   Speech:  normal pitch and normal volume   Thought Processes illogical; fast rate of thoughts; poor abstract reasoning/computation   Thought Associations tangential   Thought Content paranoid delusions, grandiose delusions, bizarre delusions and free of hallucinations   Suicidal Ideations none   Homicidal Ideations none   Mood:  depressed, euphoric and labile    Affect:  euphoric and labile   Memory recent  fair   Memory remote:  fair   Concentration/Attention:  fair   Fund of Knowledge average   Insight:  limited   Reliability poor   Judgment:  poor          VITALS:     Patient Vitals for the past 24 hrs:   Temp Pulse Resp BP   06/05/17 1537 97.1 °F (36.2 °C) 81 18 103/60     Wt Readings from Last 3 Encounters:   06/04/17 95.3 kg (210 lb)   09/01/15 89.4 kg (197 lb) (97 %, Z= 1.91)*   08/05/13 89.8 kg (98 %, Z= 2.00)*     * Growth percentiles are based on [facility] 2-20 Years data.      Temp Readings from Last 3 Encounters:   06/05/17 97.1 °F (36.2 °C)   09/01/15 98.3 °F (36.8 °C)   12/12/11 98 °F (36.7 °C) (Oral)     BP Readings from Last 3 Encounters:   06/05/17 103/60   09/01/15 102/72   08/05/13 135/79     Pulse Readings from Last 3 Encounters:   06/05/17 81   09/01/15 75   08/05/13 68            DATA     LABORATORY DATA:(reviewed/updated 6/6/2017)  No results found for this or any previous visit (from the past 24 hour(s)). No results found for: VALF2, VALAC, VALP, VALPR, DS6, CRBAM, CRBAMP, CARB2, XCRBAM  No results found for: LITHM   RADIOLOGY REPORTS:(reviewed/updated 6/6/2017)  No results found. MEDICATIONS     ALL MEDICATIONS:   Current Facility-Administered Medications   Medication Dose Route Frequency    risperiDONE (RisperDAL) tablet 2 mg  2 mg Oral BID    divalproex ER (DEPAKOTE ER) 24 hour tablet 1,500 mg  1,500 mg Oral QHS    ziprasidone (GEODON) 20 mg in sterile water (preservative free) 1 mL injection  20 mg IntraMUSCular BID PRN    OLANZapine (ZyPREXA) tablet 5 mg  5 mg Oral Q6H PRN    benztropine (COGENTIN) tablet 2 mg  2 mg Oral BID PRN    benztropine (COGENTIN) injection 2 mg  2 mg IntraMUSCular BID PRN    LORazepam (ATIVAN) injection 2 mg  2 mg IntraMUSCular Q4H PRN    LORazepam (ATIVAN) tablet 1 mg  1 mg Oral Q4H PRN    zolpidem (AMBIEN) tablet 10 mg  10 mg Oral QHS PRN    acetaminophen (TYLENOL) tablet 650 mg  650 mg Oral Q4H PRN    ibuprofen (MOTRIN) tablet 400 mg  400 mg Oral Q8H PRN    magnesium hydroxide (MILK OF MAGNESIA) 400 mg/5 mL oral suspension 30 mL  30 mL Oral DAILY PRN    nicotine (NICODERM CQ) 21 mg/24 hr patch 1 Patch  1 Patch TransDERmal DAILY PRN      SCHEDULED MEDICATIONS:   Current Facility-Administered Medications   Medication Dose Route Frequency    risperiDONE (RisperDAL) tablet 2 mg  2 mg Oral BID    divalproex ER (DEPAKOTE ER) 24 hour tablet 1,500 mg  1,500 mg Oral QHS          ASSESSMENT & PLAN     DIAGNOSES REQUIRING ACTIVE TREATMENT AND MONITORING: (reviewed/updated 6/6/2017)  Patient Active Hospital Problem List:      Bipolar affective disorder, manic, severe, with psychotic behavior ([facility] [address] (6/4/2017)    Assessment: severe psychosis and manic features on adm. Made worse by MJ and tx noncompliance issues.  Will get old recs from [facility], get collateral from mother  Pt doing sl better today, still delusional and disorganized, better sleep. Cant take the large depakote pills, will have to d/c and switch to lithium. Plan: adjust  bipolar meds. I will continue to monitor blood levels (Depakote and lithium---drugs with a narrow therapeutic index= NTI) and associated labs for drug therapy implemented that require intense monitoring for toxicity as deemed appropriate base on current medication side effects and pharmacodynamically determined drug 1/2 lives. Marijuana dependence ([facility] [address] (6/5/2017)    Assessment: chronic, will make bipolar dis worse    Plan: rehab     Non morbid obesity due to excess calories (6/5/2017)    Assessment: mod    Plan: caution with choice of psych meds      chlamydia a new dx, tx with zithromax,             In summary, Stephane Crouch, is a 22 y.o.  female who presents with a severe exacerbation of the principal diagnosis of Bipolar affective disorder, manic, severe, with psychotic behavior ([facility] [address]  Patient's condition is worsening/not improving/not stable . Patient requires continued inpatient hospitalization for further stabilization, safety monitoring and medication management. I will continue to coordinate the provision of individual, milieu, occupational, group, and substance abuse therapies to address target symptoms/diagnoses as deemed appropriate for the individual patient. A coordinated, multidisplinary treatment team round was conducted with the patient (this team consists of the nurse, psychiatric unit pharmcist,  and writer). Complete current electronic health record for patient has been reviewed today including consultant notes, ancillary staff notes, nurses and psychiatric tech notes. Suicide risk assessment completed and patient deemed to be of low risk for suicide at this time. The following regarding medications was addressed during rounds with patient:   the risks and benefits of the proposed medication. The patient was given the opportunity to ask questions. Informed consent given to the use of the above medications. Will continue to adjust psychiatric and non-psychiatric medications (see above \"medication\" section and orders section for details) as deemed appropriate & based upon diagnoses and response to treatment. I will continue to order blood tests/labs and diagnostic tests as deemed appropriate and review results as they become available (see orders for details and above listed lab/test results). I will order psychiatric records from previous [facility] to further elucidate the nature of patient's psychopathology and review once available. I will gather additional collateral information from friends, family and o/p treatment team to further elucidate the nature of patient's psychopathology and baselline level of psychiatric functioning. I certify that this patient's inpatient psychiatric hospital services furnished since the previous certification were, and continue to be, required for treatment that could reasonably be expected to improve the patient's condition, or for diagnostic study, and that the patient continues to need, on a daily basis, active treatment furnished directly by or requiring the supervision of inpatient psychiatric facility personnel. In addition, the hospital records show that services furnished were intensive treatment services, admission or related services, or equivalent services.     EXPECTED DISCHARGE DATE/DAY: TBD     DISPOSITION: Home       Signed By:   Saintclair Mis, MD  6/6/2017

## 2017-06-06 NOTE — BH NOTES
REFLECTIONS GROUP THERAPY PROGRESS NOTE    The patient Amanuel Morin is participating in Reflections Group Therapy. Group time: 30 minutes    Personal goal for participation: Share with group about feelings and concerns throughout the course of the day. Goal orientation: personal    Group therapy participation: active    Therapeutic interventions reviewed and discussed: Yes    Impression of participation:   Positive input.     Elena Albert  6/5/2017

## 2017-06-06 NOTE — CONSULTS
[facility] [place] [address]aud[address]ari, [address]   [address] [facility]       Name:  Иван Elkins   MR#:  [id number]   :  1995   Account #:  [de-identified]    Date of Consultation:  2017   Date of Adm:  2017       REFERRING PHYSICIAN: Jass Quiroga MD.    REASON FOR CONSULTATION: Medical evaluation for psychiatric   admission. CHIEF COMPLAINT: Psychosis. HISTORY OF PRESENT ILLNESS: This 26-year-old female who   presents with acute psychosis, requiring further psychiatric evaluation   and treatment. PAST MEDICAL HISTORY: None. PAST SURGICAL HISTORY: None. ALLERGIES: NONE. MEDICATIONS: None. SOCIAL HISTORY: Does smoke a pack of cigarettes per week and   occasionally drinks alcohol, denies any illicit drug use. The patient is   single, has no kids, and has her own nail shop. PHYSICAL EXAMINATION   VITAL SIGNS: Temperature is 98.1, blood pressure 144/89, pulse 71,   respirations 16, pulse oximetry 99%. Weight is 210. GENERAL: Pleasant, overweight, in no acute distress. HEENT: Oropharynx is clear. NECK: Supple. LUNGS: Clear to auscultation. No wheezes, rales or rhonchi. CARDIOVASCULAR: Regular rate. No murmurs, gallops, or rubs. ABDOMEN: Soft, nontender, nondistended, normoactive bowel   sounds. No hepatosplenomegaly. EXTREMITIES: No cyanosis, clubbing, or edema. LABORATORY DATA: Urine hCG was negative. Hemoglobin 11.9,   hematocrit 37.0. UA was negative. Sodium 140, potassium 3.8,   chloride 103, bicarbonate 25, BUN 7, creatinine 0.67, glucose 94. Acetaminophen less than 2, salicylate is equal to 1.8. Tox screen is   positive for marijuana. Alcohol level is less than 10. GC and chlamydia   is pending. IMPRESSION: This is a 26-year-old female with no past medical   history who presents with psychosis, admitted for further psychiatric   evaluation and treatment. PLAN:   1.  Psychiatry management of mental health issues. 2. Medically stable. Followup on a p.r.n. basis. 3. No VTE prophylaxis indicated or warranted at this time.              Doug Magdaleno MD DC / [facility]UD[place]   D:  06/05/2017   22:00   T:  06/05/2017   22:29   Job #:  [id number]

## 2017-06-06 NOTE — BH NOTES
GROUP THERAPY PROGRESS NOTE    The patient Maris hale 22 y.o. female is participating in Coping Skills Group. Group time: 45 minutes    Personal goal for participation: To identify positive coping strategies a-z    Goal orientation:  personal     Group therapy participation: active    Therapeutic interventions reviewed and discussed: worksheet    Impression of participation:  The patient was attentive.     [place]  6/6/2017  2:13 PM

## 2017-06-06 NOTE — PROGRESS NOTES
Problem: Altered Thought Process (Adult/Pediatric)  Goal: *STG: Participates in treatment plan  Outcome: Progressing Towards Goal  Patient is calm and cooperative. Visible on the unit. Attending groups and socializing with peers. Will continue to monitor patient and assess needs.

## 2017-06-07 PROCEDURE — 74011250637 HC RX REV CODE- 250/637: Performed by: PSYCHIATRY & NEUROLOGY

## 2017-06-07 PROCEDURE — 65220000003 HC RM SEMIPRIVATE PSYCH

## 2017-06-07 RX ADMIN — RISPERIDONE 2 MG: 1 TABLET ORAL at 08:45

## 2017-06-07 RX ADMIN — LITHIUM CARBONATE 450 MG: 450 TABLET, EXTENDED RELEASE ORAL at 17:25

## 2017-06-07 RX ADMIN — ZOLPIDEM TARTRATE 10 MG: 10 TABLET, FILM COATED ORAL at 21:07

## 2017-06-07 RX ADMIN — BENZTROPINE MESYLATE 2 MG: 2 TABLET ORAL at 17:28

## 2017-06-07 RX ADMIN — LITHIUM CARBONATE 450 MG: 450 TABLET, EXTENDED RELEASE ORAL at 08:45

## 2017-06-07 RX ADMIN — RISPERIDONE 2 MG: 1 TABLET ORAL at 17:25

## 2017-06-07 NOTE — BH NOTES
PSYCHIATRIC PROGRESS NOTE         Patient Name  Soren Delacruz   Date of Birth 1995   [facility] [id number]   Medical Record Number  [id number]      Age  22 y.o. PCP None   Admit date:  6/4/2017    Room Number  325/02  @ [facility]   Date of Service  6/7/2017          PSYCHOTHERAPY SESSION NOTE:  Length of psychotherapy session: 20 minutes    Main condition/diagnosis/issues treated during session today, 6/7/2017 : delusions, denial, confabulation    I employed Cognitive Behavioral therapy techniques, Reality-Oriented psychotherapy, as well as supportive psychotherapy in regards to various ongoing psychosocial stressors, including the following: pre-admission and current problems; housing issues; occupational issues; academic issues; legal issues; medical issues; and stress of hospitalization. Interpersonal relationship issues and psychodynamic conflicts explored. Attempts made to alleviate maladaptive patterns. We, also, worked on issues of denial & effects of substance dependency/use     Overall, patient is not progressing    Treatment Plan Update (reviewed an updated 6/7/2017) : I will modify psychotherapy tx plan by implementing more stress management strategies, building upon cognitive behavioral techniques, increasing coping skills, as well as shoring up psychological defenses). An extended energy and skill set was needed to engage pt in psychotherapy due to some of the following: resistiveness, complexity, negativity, confrontational nature, hostile behaviors, and/or severe abnormalities in thought processes/psychosis resulting in the loss of expressive/receptive language communication skills. E & M PROGRESS NOTE:         HISTORY       CC:  \"i feel rested\"  HISTORY OF PRESENT ILLNESS/INTERVAL HISTORY:  (reviewed/updated 6/7/2017). per initial evaluation: The patient, Soren Delacruz, is a 22 y.o.   BLACK OR  female with a past psychiatric history significant for bipolar dis, who presents at this time with complaints of (and/or evidence of) the following emotional symptoms: agitation, psychotic behavior and tammi. Additional symptomatology include disorganization of thought processes, delusions (somatic, paranoid), bizarre behaviors, denial and insomnia. The above symptoms have been present for a few weeks. These symptoms are of severe severity. These symptoms are constant in nature. The patient's condition has been precipitated by psychosocial stressors (kicked out of mother's home, no job ). Patient's condition made worse by continued illicit drug use as well as treatment noncompliance. UDS: +MJ . BAL=0.     ght processes, delusions (somatic, paranoid), bizarre behaviors, denial and insomnia. The above symptoms have been present for a few weeks. These symptoms are of severe severity. These symptoms are constant in nature. The patient's condition has been precipitated by psychosocial stressors (kicked out of mother's home, no job ). Patient's condition made worse by continued illicit drug use as wel  Abelardo Moyer presents/reports/evidences the following emotional symptoms today, 6/7/2017:tammi and psychosis. The above symptoms have been present for a few months. These symptoms are of severe severity. The symptoms are constant in nature. Additional symptomatology and features include del and confabulation, euphoric, disorganization of thought processes, poor insight. SIDE EFFECTS: (reviewed/updated 6/7/2017)  None reported or admitted to. No noted toxicity with use of lithium   ALLERGIES:(reviewed/updated 6/7/2017)  Allergies   Allergen Reactions    Peanut Hives      MEDICATIONS PRIOR TO ADMISSION:(reviewed/updated 6/7/2017)  No prescriptions prior to admission.       PAST MEDICAL HISTORY: Past medical history from the initial psychiatric evaluation has been reviewed (reviewed/updated 6/7/2017) with no additional updates (I asked patient and no additional past medical history provided). Past Medical History:   Diagnosis Date    Ankle fracture     Bipolar disorder ([facility] [address]    History reviewed. No pertinent surgical history. SOCIAL HISTORY: Social history from the initial psychiatric evaluation has been reviewed (reviewed/updated 6/7/2017) with no additional updates (I asked patient and no additional social history provided). Social History     Social History    Marital status: SINGLE     Spouse name: N/A    Number of children: N/A    Years of education: N/A     Occupational History    Not on file. Social History Main Topics    Smoking status: Current Some Day Smoker     Types: Cigarettes    Smokeless tobacco: Never Used    Alcohol use Yes      Comment: socially    Drug use: Yes     Special: Marijuana      Comment: \"Triple C\" (coricedin)    Sexual activity: No     Other Topics Concern    Not on file     Social History Narrative    Single, no children. Uses MJ. Homeless, kicked out of mother's house. HS grad. No job---last one in 2015. [email or web address]. H/o arrests x 1. FAMILY HISTORY: Family history from the initial psychiatric evaluation has been reviewed (reviewed/updated 6/7/2017) with no additional updates (I asked patient and no additional family history provided).    Family History   Problem Relation Age of Onset    Bipolar Disorder Mother        REVIEW OF SYSTEMS: (reviewed/updated 6/7/2017)  Appetite:improved   Sleep: improved   All other Review of Systems: Respiratory ROS: no cough, shortness of breath, or wheezing  Cardiovascular ROS: no chest pain or dyspnea on exertion  negative  Gastrointestinal ROS: no abdominal pain, change in bowel habits, or black or bloody stools  negative  Neurological ROS: no TIA or stroke symptoms  negative         [facility] ([facility]):    [facility] FINDINGS ARE WITHIN NORMAL LIMITS (WNL) UNLESS OTHERWISE STATED BELOW. ( ALL OF THE BELOW CATEGORIES OF THE MSE HAVE BEEN REVIEWED (reviewed 6/7/2017) AND UPDATED AS DEEMED APPROPRIATE )  General Presentation age appropriate and casually dressed, cooperative and unreliable   Orientation disorganized, not oriented to situation, oriented to time, place and person   Vital Signs  See below (reviewed 6/7/2017); Vital Signs (BP, Pulse, & Temp) are within normal limits if not listed below. Gait and Station Stable/steady, no ataxia   Musculoskeletal System No extrapyramidal symptoms (EPS); no abnormal muscular movements or Tardive Dyskinesia (TD); muscle strength and tone are within normal limits   Language No aphasia or dysarthria   Speech:  normal pitch and normal volume   Thought Processes illogical; wnl rate of thoughts; fair abstractions   Thought Associations Log and goal directed mostly, less tangential   Thought Content No del and no aud milton   Suicidal Ideations none   Homicidal Ideations none   Mood:  depressed, euphoric and labile    Affect:  euphoric and labile   Memory recent  fair   Memory remote:  fair   Concentration/Attention:  fair   Fund of Knowledge average   Insight:  limited   Reliability poor   Judgment:  poor          VITALS:     Patient Vitals for the past 24 hrs:   Temp Pulse Resp BP SpO2   06/07/17 0600 96.9 °F (36.1 °C) 73 18 112/74 -   06/06/17 2220 - - - 116/65 -   06/06/17 2200 - 94 - (!) 85/53 100 %   06/06/17 2133 - (!) 106 - 111/75 100 %     Wt Readings from Last 3 Encounters:   06/04/17 95.3 kg (210 lb)   09/01/15 89.4 kg (197 lb) (97 %, Z= 1.91)*   08/05/13 89.8 kg (98 %, Z= 2.00)*     * Growth percentiles are based on CDC 2-20 Years data.      Temp Readings from Last 3 Encounters:   06/07/17 96.9 °F (36.1 °C)   09/01/15 98.3 °F (36.8 °C)   12/12/11 98 °F (36.7 °C) (Oral)     BP Readings from Last 3 Encounters:   06/07/17 112/74   09/01/15 102/72   08/05/13 135/79     Pulse Readings from Last 3 Encounters:   06/07/17 73   09/01/15 75   08/05/13 68            DATA LABORATORY DATA:(reviewed/updated 6/7/2017)  No results found for this or any previous visit (from the past 24 hour(s)). No results found for: VALF2, VALAC, VALP, VALPR, DS6, CRBAM, CRBAMP, CARB2, XCRBAM  No results found for: LITHM   RADIOLOGY REPORTS:(reviewed/updated 6/7/2017)  No results found. MEDICATIONS     ALL MEDICATIONS:   Current Facility-Administered Medications   Medication Dose Route Frequency    risperiDONE (RisperDAL) tablet 2 mg  2 mg Oral BID    lithium carbonate CR (ESKALITH CR) tablet 450 mg  450 mg Oral BID    ziprasidone (GEODON) 20 mg in sterile water (preservative free) 1 mL injection  20 mg IntraMUSCular BID PRN    OLANZapine (ZyPREXA) tablet 5 mg  5 mg Oral Q6H PRN    benztropine (COGENTIN) tablet 2 mg  2 mg Oral BID PRN    benztropine (COGENTIN) injection 2 mg  2 mg IntraMUSCular BID PRN    LORazepam (ATIVAN) injection 2 mg  2 mg IntraMUSCular Q4H PRN    LORazepam (ATIVAN) tablet 1 mg  1 mg Oral Q4H PRN    zolpidem (AMBIEN) tablet 10 mg  10 mg Oral QHS PRN    acetaminophen (TYLENOL) tablet 650 mg  650 mg Oral Q4H PRN    magnesium hydroxide (MILK OF MAGNESIA) 400 mg/5 mL oral suspension 30 mL  30 mL Oral DAILY PRN    nicotine (NICODERM CQ) 21 mg/24 hr patch 1 Patch  1 Patch TransDERmal DAILY PRN      SCHEDULED MEDICATIONS:   Current Facility-Administered Medications   Medication Dose Route Frequency    risperiDONE (RisperDAL) tablet 2 mg  2 mg Oral BID    lithium carbonate CR (ESKALITH CR) tablet 450 mg  450 mg Oral BID          ASSESSMENT & PLAN     DIAGNOSES REQUIRING ACTIVE TREATMENT AND MONITORING: (reviewed/updated 6/7/2017)  Patient Active Hospital Problem List:      Bipolar affective disorder, manic, severe, with psychotic behavior ([facility] [address] (6/4/2017)    Assessment: severe psychosis and manic features on adm. Made worse by MJ and tx noncompliance issues.  Will get old recs from [facility], get collateral from mother  Pt doing much better today, still euphoric, delusional and disorganized, better sleep. Cant take the large depakote pills, will have to d/c and switch to lithium. Plan: adjust  bipolar meds. I will continue to monitor blood levels (Depakote and lithium---drugs with a narrow therapeutic index= NTI) and associated labs for drug therapy implemented that require intense monitoring for toxicity as deemed appropriate base on current medication side effects and pharmacodynamically determined drug 1/2 lives. Marijuana dependence ([facility] [address] (6/5/2017)    Assessment: chronic, will make bipolar dis worse    Plan: rehab     Non morbid obesity due to excess calories (6/5/2017)    Assessment: mod    Plan: caution with choice of psych meds      chlamydia --no sxs, cont with zithromax,             In summary, Stephane Crouch, is a 22 y.o.  female who presents with a severe exacerbation of the principal diagnosis of Bipolar affective disorder, manic, severe, with psychotic behavior ([facility] [address]  Patient's condition is worsening/not improving/not stable . Patient requires continued inpatient hospitalization for further stabilization, safety monitoring and medication management. I will continue to coordinate the provision of individual, milieu, occupational, group, and substance abuse therapies to address target symptoms/diagnoses as deemed appropriate for the individual patient. A coordinated, multidisplinary treatment team round was conducted with the patient (this team consists of the nurse, psychiatric unit pharmcist,  and writer). Complete current electronic health record for patient has been reviewed today including consultant notes, ancillary staff notes, nurses and psychiatric tech notes. Suicide risk assessment completed and patient deemed to be of low risk for suicide at this time. The following regarding medications was addressed during rounds with patient:   the risks and benefits of the proposed medication.  The patient was given the opportunity to ask questions. Informed consent given to the use of the above medications. Will continue to adjust psychiatric and non-psychiatric medications (see above \"medication\" section and orders section for details) as deemed appropriate & based upon diagnoses and response to treatment. I will continue to order blood tests/labs and diagnostic tests as deemed appropriate and review results as they become available (see orders for details and above listed lab/test results). I will order psychiatric records from previous [facility] hospitals to further elucidate the nature of patient's psychopathology and review once available. I will gather additional collateral information from friends, family and o/p treatment team to further elucidate the nature of patient's psychopathology and baselline level of psychiatric functioning. I certify that this patient's inpatient psychiatric hospital services furnished since the previous certification were, and continue to be, required for treatment that could reasonably be expected to improve the patient's condition, or for diagnostic study, and that the patient continues to need, on a daily basis, active treatment furnished directly by or requiring the supervision of inpatient psychiatric facility personnel. In addition, the hospital records show that services furnished were intensive treatment services, admission or related services, or equivalent services.     EXPECTED DISCHARGE DATE/DAY: TBD     DISPOSITION: Home       Signed By:   Ana Rosa Noble MD  6/7/2017

## 2017-06-07 NOTE — BH NOTES
Social Work     Pt was seen in treatment team this morning. Pt is alert and oriented. Pt denies SI/HI. Pt's mood is euthymic and pleasant , affect is euthymic. Pt's thought process is logical.Pt's insight and judgment is fair, reliability is fair. Clinician has attempted to contact the pt's mother several times however the phone rings until voicemail. Pt reported that her mother will not  the phone for an unknown number. Pt plans to return to the [facility] in [place], [place]. Social work department will continue to coordinate discharge plans.

## 2017-06-07 NOTE — PROGRESS NOTES
Abelardo actively participated in [address] [facility] on [address]  [facility] [id number]

## 2017-06-07 NOTE — BH NOTES
GROUP THERAPY PROGRESS NOTE    The patient Karina hale 22 y.o. female is participating in Reflections Group    Group time: 30 minutes    Personal goal for participation: To discuss the daily goals    Goal orientation: personal    Group therapy participation: passive    Therapeutic interventions reviewed and discussed:  Yes    Impression of participation: thanh Betancourt  6/6/2017  10:42 PM

## 2017-06-07 NOTE — BH NOTES
GROUP THERAPY PROGRESS NOTE    The patient Alberto hale 22 y.o. female is participating in Coping Skills Group. Group time: 45 minutes    Personal goal for participation: To participate in self esteem iCabbi game    Goal orientation:  personal    Group therapy participation: active    Therapeutic interventions reviewed and discussed: things pertaining to self esteem    Impression of participation:  The patient was attentive.     Mervin Leger  6/7/2017  2:08 PM

## 2017-06-07 NOTE — BH NOTES
GROUP THERAPY PROGRESS NOTE    The patient Verito hale 22 y.o. female is participating in Creative Expression Group. Group time: 1 hour    Personal goal for participation: To concentrate on selected task    Goal orientation: social    Group therapy participation: active    Therapeutic interventions reviewed and discussed: Crafts, games, music    Impression of participation: The patient was attentive.     Dara Leger  6/7/2017  5:30 PM

## 2017-06-07 NOTE — BH NOTES
Pt's difficulty speaking resolved within seconds of receiving IM cogentin. She had written \"hurry\" and \"I can't\" on a notebook. Completely asymptomatic now. She is smiling and stated she has a difficult time in closed spaces. Provided emotional reassurance and listened to patient's concern that she feels she is being held against her will. Pt makes good eye contact. Affect has range, mood is labile. Hygiene is adequate, independent in ADLs. Gait is steady. Sleep and appetite patterns WNL. Patient denies SI/HI and hallucinations. She seems to benefit from reassurance. Pt encouraged to continue to participate in care and seek staff with concerns. Will continue to monitor pt with q 15 min checks.

## 2017-06-07 NOTE — BH NOTES
GROUP THERAPY PROGRESS NOTE    Janeth Pimentel is participating in Process Group. Group time: 30 minutes    Personal goal for participation: Utilize reading to relate personal experiences    Goal orientation: personal    Group therapy participation: active    Therapeutic interventions reviewed and discussed:   Reading (poem): Lifes Tug of War -    Pt reported feeling happy and stated that she is perhaps too happy due to the medication. She stated that the poem reminded her that the people (mother, brother, & friend) she ought to be most grateful for are the people that she has been the meanest too. Pt reported that she wants to be a better person and treat her family with more kindness and appreciation. Impression of participation:   Pt presented with a full affect and euthymic mood. She was an active participant and contributed to discussion. Pts behavior demonstrated pressured speech. Her thoughts demonstrated tangentiality, flight of ideas, and perseveration.        MEG Grider, Supervisee in Social Work

## 2017-06-07 NOTE — PROGRESS NOTES
Problem: Altered Thought Process (Adult/Pediatric)  Goal: *STG: Participates in treatment plan  Outcome: Progressing Towards Goal  Patient is alert and oriented. Visible on unit,compliant with medications and meals. Patient attends groups and interacts appropriately with peers and staff. Patient stated she would like to be tested for HIV. Patient is willing to sign consent for blood draw. Staff will continue safety checks and monitor for increased anxiety.

## 2017-06-07 NOTE — BH NOTES
TRANSFER - OUT REPORT:    Verbal report given to A Young RN on [employer]  being transferred to General ) for routine progression of care       Report consisted of patients Situation, Background, Assessment and   Recommendations(SB AR). Information from the following report(s) SBAR, MAR and Recent Results was reviewed with the receiving nurse. Lines:       Opportunity for questions and clarification was provided.       Patient transported with:   Registered Nurse

## 2017-06-07 NOTE — BH NOTES
GROUP THERAPY PROGRESS NOTE    Stephane Crouch is participating in [place].      Group time: 30 minutes    Personal goal for participation: daily orientation    Goal orientation: personal    Group therapy participation: active    Therapeutic interventions reviewed and discussed: yes    Impression of participation: Attend            Bettie Hadley  6/7/2017

## 2017-06-07 NOTE — BH NOTES
Patient arrived at medication window and stated that she was having difficulty talking due to swelling of her tongue. She stated that it was difficult to talk and to breathe. Patient VS assessed. Patient given 2mg Cogentin IM. Will continue to monitor.

## 2017-06-08 VITALS
SYSTOLIC BLOOD PRESSURE: 126 MMHG | DIASTOLIC BLOOD PRESSURE: 83 MMHG | BODY MASS INDEX: 41.23 KG/M2 | WEIGHT: 210 LBS | HEIGHT: 60 IN | OXYGEN SATURATION: 100 % | HEART RATE: 105 BPM | TEMPERATURE: 97.6 F | RESPIRATION RATE: 18 BRPM

## 2017-06-08 LAB
CHOLEST SERPL-MCNC: 146 MG/DL
DATE LAST DOSE: ABNORMAL
HDLC SERPL-MCNC: 74 MG/DL
HDLC SERPL: 2 {RATIO} (ref 0–5)
LDLC SERPL CALC-MCNC: 59.2 MG/DL (ref 0–100)
LIPID PROFILE,FLP: NORMAL
LITHIUM SERPL-SCNC: 0.36 MMOL/L (ref 0.6–1.2)
REPORTED DOSE,DOSE: ABNORMAL UNITS
REPORTED DOSE/TIME,TMG: ABNORMAL
TRIGL SERPL-MCNC: 64 MG/DL (ref ?–150)
TSH SERPL DL<=0.05 MIU/L-ACNC: 2.98 UIU/ML (ref 0.36–3.74)
VLDLC SERPL CALC-MCNC: 12.8 MG/DL

## 2017-06-08 PROCEDURE — 74011250637 HC RX REV CODE- 250/637: Performed by: PSYCHIATRY & NEUROLOGY

## 2017-06-08 PROCEDURE — 36415 COLL VENOUS BLD VENIPUNCTURE: CPT | Performed by: PSYCHIATRY & NEUROLOGY

## 2017-06-08 PROCEDURE — 80061 LIPID PANEL: CPT | Performed by: PSYCHIATRY & NEUROLOGY

## 2017-06-08 PROCEDURE — 84443 ASSAY THYROID STIM HORMONE: CPT | Performed by: PSYCHIATRY & NEUROLOGY

## 2017-06-08 PROCEDURE — 80178 ASSAY OF LITHIUM: CPT | Performed by: PSYCHIATRY & NEUROLOGY

## 2017-06-08 RX ORDER — RISPERIDONE 4 MG/1
4 TABLET, FILM COATED ORAL
Qty: 7 TAB | Refills: 1 | Status: SHIPPED | OUTPATIENT
Start: 2017-06-08

## 2017-06-08 RX ORDER — LITHIUM CARBONATE 450 MG/1
900 TABLET ORAL
Qty: 14 TAB | Refills: 1 | Status: SHIPPED | OUTPATIENT
Start: 2017-06-08

## 2017-06-08 RX ADMIN — RISPERIDONE 2 MG: 1 TABLET ORAL at 08:22

## 2017-06-08 RX ADMIN — LITHIUM CARBONATE 450 MG: 450 TABLET, EXTENDED RELEASE ORAL at 08:22

## 2017-06-08 RX ADMIN — BENZTROPINE MESYLATE 2 MG: 2 TABLET ORAL at 08:23

## 2017-06-08 NOTE — BH NOTES
Social Work     Pt will be discharged today. She will be returning to the [place] [address] in [facility]. Pt will be picked up by her friend. Pt is alert and oriented. Pt denies SI/HI. Pt is free of delusions. Pt's mood is euthymic. Pt's thought process is logical. Pt is in agreement with the plan. Pt received 7 days of michelle funded medications.      Follow-up  [address]  [phone]  Intake appointment 6/8/17 @ 8:30AM-2PM for mental health services   [facility] care plan will be faxed electronically

## 2017-06-08 NOTE — BH NOTES
GROUP THERAPY PROGRESS NOTE    Pam Elkins is participating in Process Group. Group time: 50 minutes    Personal goal for participation: Identify existing and new methods of self-care to promote mental health    Goal orientation: personal    Group therapy participation: active    Therapeutic interventions reviewed and discussed:   Topic: Mental Health & Self-care    Pt reported feeling ecstatic, confident, and hysterical because she has been laughing a lot. She stated that self-care means having good hygiene and grooming, being mindful of one's environment for safety reasons, and loving one's self. Pt reported that she needs sleep to be able to \"fight off the negative things\" that occurs. She stated that lack of sleep and not taking medications as prescribed leads to damaging her body and her relationships. She reported that her mother is not supportive, but that her brother and friend are very supportive. Impression of participation:   Pt presented with a full affect and euthymic mood. She was an active participant and contributed to discussion. Pts behavior was appropriate and thoughts organized.     MEG Lucio, Supervisee in Social Work

## 2017-06-08 NOTE — BH NOTES
GROUP THERAPY PROGRESS NOTE    The patient Oleksandr hale 22 y.o. female is participating in Coping Skills Group. Group time: 45 minutes    Personal goal for participation: To participate in happiness game    Goal orientation:  personal    Group therapy participation: active    Therapeutic interventions reviewed and discussed: life scenarios exploring the pursuit of happiness    Impression of participation:  The patient was attentive.     Tunde Leger  6/8/2017  1:48 PM

## 2017-06-08 NOTE — BH NOTES
GROUP THERAPY PROGRESS NOTE    Pia Jhonnys is participating in [place].      Group time: 30 minutes    Personal goal for participation: daily orientation    Goal orientation: personal    Group therapy participation: active    Therapeutic interventions reviewed and discussed: yes    Impression of participation: cooperative

## 2017-06-08 NOTE — BH NOTES
UPDATE: the 2100 hour the pt was educated to the treatment of Chlamydia. The pt was given prn Ambien.

## 2017-06-08 NOTE — PROGRESS NOTES
Laboratory Monitoring for Lithium    This patient is currently prescribed the following medication(s):   Current Facility-Administered Medications   Medication Dose Route Frequency    risperiDONE (RisperDAL) tablet 2 mg  2 mg Oral BID    lithium carbonate CR (ESKALITH CR) tablet 450 mg  450 mg Oral BID       The following labs have been completed for monitoring of lithium:    Lithium Serum Concentration  0.36 mmol/L     Renal Function and Chemistry  Lab Results   Component Value Date/Time    Sodium 140 06/04/2017 07:23 AM    Potassium 3.8 06/04/2017 07:23 AM    Chloride 103 06/04/2017 07:23 AM    CO2 25 06/04/2017 07:23 AM    Anion gap 12 06/04/2017 07:23 AM    BUN 7 06/04/2017 07:23 AM    Creatinine 0.67 06/04/2017 07:23 AM    BUN/Creatinine ratio 10 06/04/2017 07:23 AM       Assessment/Plan:  Lithium level low, 0.36 mmol/L, however drawn only after 2 complete days of therapy and not at steady-state. Same dose continued at discharge.       Adrienne Stevens, PharmD, BCPS  [phone]

## 2017-06-08 NOTE — BH NOTES
Patient has been resting in their room with their eyes closed and has showed no signs of distress through out the shift. Patient slept for 7 hours. Patient is on every 15 minute checks for safety.

## 2017-06-08 NOTE — BH NOTES
Patient verbalized about feeling better,patient wants discharge,says she feels stabilized,denies suicidal,homicidal ideation. After the patient talked with her doctor and ,patient states she will continue follow up treatment with [facility]. Patient states when she is discharged her friend jag will transport her back to the hotel where she was living before admission. no signs of distress,anxiety,behavior is appropriate and patient is complaint with med's,meals and remains on Q 15 minute checks for safety.

## 2017-06-08 NOTE — DISCHARGE INSTRUCTIONS
DISCHARGE SUMMARY from Nurse    The following personal items are in your possession at time of discharge:    Dental Appliances: None  Visual Aid: None     Home Medications: None  Jewelry: None  Clothing: Pants, Shirt  Other Valuables: None             PATIENT INSTRUCTIONS:          What to do at Home:  Recommended activity: Activity as tolerated,     If I feel that I can not keep these promises and I am at risk of hurting myself or others, I will call the crisis office and speak with a crisis worker who will assist me during my crisis. [facility]  [address] [facility]  [phone]           *  Please give a list of your current medications to your Primary Care Provider. *  Please update this list whenever your medications are discontinued, doses are      changed, or new medications (including over-the-counter products) are added. *  Please carry medication information at all times in case of emergency situations. These are general instructions for a healthy lifestyle:    No smoking/ No tobacco products/ Avoid exposure to second hand smoke    Surgeon General's Warning:  Quitting smoking now greatly reduces serious risk to your health. Obesity, smoking, and sedentary lifestyle greatly increases your risk for illness    A healthy diet, regular physical exercise & weight monitoring are important for maintaining a healthy lifestyle    Y  Recognize signs and symptoms of STROKE:    F-face looks uneven    A-arms unable to move or move unevenly    S-speech slurred or non-existent    T-time-call 911 as soon as signs and symptoms begin-DO NOT go       Back to bed or wait to see if you get better-TIME IS BRAIN. Warning Signs of HEART ATTACK     Call 911 if you have these symptoms:   Chest discomfort.  Most heart attacks involve discomfort in the center of the chest that lasts more than a few minutes, or that goes away and comes back. It can feel like uncomfortable pressure, squeezing, fullness, or pain.  Discomfort in other areas of the upper body. Symptoms can include pain or discomfort in one or both arms, the back, neck, jaw, or stomach.  Shortness of breath with or without chest discomfort.  Other signs may include breaking out in a cold sweat, nausea, or lightheadedness. Don't wait more than five minutes to call 911 - MINUTES MATTER! Fast action can save your life. Calling 911 is almost always the fastest way to get lifesaving treatment. Emergency Medical Services staff can begin treatment when they arrive -- up to an hour sooner than if someone gets to the hospital by car. The discharge information has been reviewed with the patient. The patient verbalized understanding. Discharge medications reviewed with the patient and appropriate educational materials and side effects teaching were provided.

## 2018-03-12 NOTE — DISCHARGE SUMMARY
PSYCHIATRIC DISCHARGE SUMMARY         IDENTIFICATION:    Patient Name  Karina Ozuna   Date of Birth 1995   [facility] [id number]   Medical Record Number  [id number]      Age  22 y.o. PCP None   Admit date:  6/4/2017    Discharge date: 6/8/2017   Room Number  325/02  @ [facility]   Date of Service  6/8/2017            TYPE OF DISCHARGE: REGULAR               CONDITION AT DISCHARGE: improved       PROVISIONAL & DISCHARGE DIAGNOSES:    Problem List  Date Reviewed: 9/1/2015          Codes Class    [facility] ([facility] ICD-10-CM: F12.20  ICD-9-CM: 304.30         Non morbid obesity due to excess calories ICD-10-CM: E66.09  ICD-9-CM: 278.00         * (Principal)Bipolar affective disorder, manic, severe, with psychotic behavior ([facility] ICD-10-CM: F31.2  ICD-9-CM: 296.44         Acne ICD-10-CM: L70.9  ICD-9-CM: 706.1         General counseling and advice for contraceptive management ICD-10-CM: Z30.09  ICD-9-CM: V25.09               Active Hospital Problems    Marijuana dependence ([facility]      Non morbid obesity due to excess calories      *Bipolar affective disorder, manic, severe, with psychotic behavior ([facility]        DISCHARGE DIAGNOSIS:   Axis I:  SEE ABOVE  Axis II: SEE ABOVE  Axis III: SEE ABOVE  Axis IV:  Homeless, lack of structure  Axis V:  20 on admission, 65 on discharge      CC & HISTORY OF PRESENT ILLNESS:  The patient, Karina Ozuna, is a 22 y.o. BLACK OR  female with a past psychiatric history significant for bipolar dis, who presents at this time with complaints of (and/or evidence of) the following emotional symptoms: agitation, psychotic behavior and tammi. Additional symptomatology include disorganization of thought processes, delusions (somatic, paranoid), bizarre behaviors, denial and insomnia. The above symptoms have been present for a few weeks. These symptoms are of severe severity. These symptoms are constant in nature.   The patient's condition has been precipitated by psychosocial stressors (homeless, no job ). Patient's condition made worse by continued illicit drug use as well as treatment noncompliance. UDS: +MJ . BAL=0.        SOCIAL HISTORY:    Social History     Social History    Marital status: SINGLE     Spouse name: N/A    Number of children: N/A    Years of education: N/A     Occupational History    Not on file. Social History Main Topics    Smoking status: Current Some Day Smoker     Types: Cigarettes    Smokeless tobacco: Never Used    Alcohol use Yes      Comment: socially    Drug use: Yes     Special: Marijuana      Comment: \"Triple C\" (coricedin)    Sexual activity: No     Other Topics Concern    Not on file     Social History Narrative    Single, no children. Uses [employer]. Homeless, kicked out of mother's house. HS grad. No job---last one in 2015. [employer]. H/o arrests x 1. FAMILY HISTORY:   Family History   Problem Relation Age of Onset    Bipolar Disorder Mother              HOSPITALIZATION COURSE:    Concepción Stanton was admitted to the inpatient psychiatric unit [facility] for acute psychiatric stabilization in regards to symptomatology as described in the HPI above. The differential diagnosis at time of admission included: bipolar dis vs. Substance induced mood dis. While on the unit Abelardo Martinez was involved in individual, group, occupational and milieu therapy. Psychiatric medications were adjusted during this hospitalization including lithium and risperdal.   Concepción Stanton demonstrated a slow, but progressive improvement in overall condition. Please see individual progress notes for more specific details regarding patient's hospitalization course. At time of discharge, Concepción Stanton is without significant problems of depression, psychosis and only hypo- tammi present.  Patient free of suicidal and homicidal ideations (appears to be at very low risk of suicide or homicide) and reports many positive predictive factors in terms of not attempting suicide or homicide. Overall presentation at time of discharge is most consistent with the diagnosis of bipolar disorder though can not rule out MJ induced tammi/psychosis. Patient with request for discharge today. There are no grounds to seek a TDO. All members of the treatment team concur with each other in regards to plans for discharge today per patient's request.  Patient aware and in agreement with discharge and discharge plan. Per my last note:   Bipolar affective disorder, manic, severe, with psychotic behavior ([facility] (6/4/2017)    Assessment: severe psychosis and manic features on adm. Made worse by MJ and tx noncompliance issues. Will get old recs from [facility], get collateral from mother  Pt doing much better today, still euphoric, delusional and disorganized, better sleep. Cant take the large depakote pills, will have to d/c and switch to lithium. Plan: adjust  bipolar meds. I will continue to monitor blood levels (Depakote and lithium---drugs with a narrow therapeutic index= NTI) and associated labs for drug therapy implemented that require intense monitoring for toxicity as deemed appropriate base on current medication side effects and pharmacodynamically determined drug 1/2 lives.      Marijuana dependence ([facility] (6/5/2017)    Assessment: chronic, will make bipolar dis worse    Plan: rehab     Non morbid obesity due to excess calories (6/5/2017)    Assessment: mod    Plan: caution with choice of psych meds      chlamydia --no sxs, cont with zithromax,                   LABS AND IMAGAING:    Labs Reviewed   CBC WITH AUTOMATED DIFF - Abnormal; Notable for the following:        Result Value    MCV 75.7 (*)     MCH 24.3 (*)     All other components within normal limits   METABOLIC PANEL, COMPREHENSIVE - Abnormal; Notable for the following:     BUN/Creatinine ratio 10 (*)     Protein, total 8.6 (*)     Globulin 4.6 (*)     A-G Ratio 0.9 (*)     All other components within normal limits   DRUG SCREEN, URINE - Abnormal; Notable for the following:     THC (TH-CANNABINOL) POSITIVE (*)     All other components within normal limits   URINALYSIS W/ REFLEX CULTURE - Abnormal; Notable for the following:     Leukocyte Esterase TRACE (*)     All other components within normal limits   ACETAMINOPHEN - Abnormal; Notable for the following:     Acetaminophen level <2 (*)     All other components within normal limits   SALICYLATE - Abnormal; Notable for the following:     SALICYLATE 1.8 (*)     All other components within normal limits   CHLAMYDIA / GC AMPLIFICATION - Abnormal; Notable for the following:     Chlamydia amplified POSITIVE (*)     All other components within normal limits   LITHIUM - Abnormal; Notable for the following:     Lithium level 0.36 (*)     All other components within normal limits   ETHYL ALCOHOL   HCG QL SERUM   LIPID PANEL   TSH 3RD GENERATION     No results found for: DS35, PHEN, PHENO, PHENT, DILF, DS39, PHENY, PTN, VALF2, VALAC, VALP, VALPR, DS6, CRBAM, CRBAMP, CARB2, XCRBAM  Admission on 06/04/2017   Component Date Value Ref Range Status    WBC 06/04/2017 10.4  3.6 - 11.0 K/uL Final    RBC 06/04/2017 4.89  3.80 - 5.20 M/uL Final    HGB 06/04/2017 11.9  11.5 - 16.0 g/dL Final    HCT 06/04/2017 37.0  35.0 - 47.0 % Final    MCV 06/04/2017 75.7* 80.0 - 99.0 FL Final    MCH 06/04/2017 24.3* 26.0 - 34.0 PG Final    MCHC 06/04/2017 32.2  30.0 - 36.5 g/dL Final    RDW 06/04/2017 13.8  11.5 - 14.5 % Final    PLATELET 18/45/6815 106  150 - 400 K/uL Final    NEUTROPHILS 06/04/2017 63  32 - 75 % Final    LYMPHOCYTES 06/04/2017 30  12 - 49 % Final    MONOCYTES 06/04/2017 7  5 - 13 % Final    EOSINOPHILS 06/04/2017 0  0 - 7 % Final    BASOPHILS 06/04/2017 0  0 - 1 % Final    ABS. NEUTROPHILS 06/04/2017 6.5  1.8 - 8.0 K/UL Final    ABS. LYMPHOCYTES 06/04/2017 3.2  0.8 - 3.5 K/UL Final    ABS.  MONOCYTES 06/04/2017 0.7  0.0 - 1.0 K/UL Final    ABS. EOSINOPHILS 06/04/2017 0.0  0.0 - 0.4 K/UL Final    ABS. BASOPHILS 06/04/2017 0.0  0.0 - 0.1 K/UL Final    Sodium 06/04/2017 140  136 - 145 mmol/L Final    Potassium 06/04/2017 3.8  3.5 - 5.1 mmol/L Final    Chloride 06/04/2017 103  97 - 108 mmol/L Final    CO2 06/04/2017 25  21 - 32 mmol/L Final    Anion gap 06/04/2017 12  5 - 15 mmol/L Final    Glucose 06/04/2017 94  65 - 100 mg/dL Final    BUN 06/04/2017 7  6 - 20 MG/DL Final    Creatinine 06/04/2017 0.67  0.55 - 1.02 MG/DL Final    BUN/Creatinine ratio 06/04/2017 10* 12 - 20   Final    GFR est AA 06/04/2017 >60  >60 ml/min/1.73m2 Final    GFR est non-AA 06/04/2017 >60  >60 ml/min/1.73m2 Final    Calcium 06/04/2017 9.0  8.5 - 10.1 MG/DL Final    Bilirubin, total 06/04/2017 0.3  0.2 - 1.0 MG/DL Final    ALT (SGPT) 06/04/2017 17  12 - 78 U/L Final    AST (SGOT) 06/04/2017 21  15 - 37 U/L Final    Alk.  phosphatase 06/04/2017 96  45 - 117 U/L Final    Protein, total 06/04/2017 8.6* 6.4 - 8.2 g/dL Final    Albumin 06/04/2017 4.0  3.5 - 5.0 g/dL Final    Globulin 06/04/2017 4.6* 2.0 - 4.0 g/dL Final    A-G Ratio 06/04/2017 0.9* 1.1 - 2.2   Final    AMPHETAMINE 06/04/2017 NEGATIVE   NEG   Final    BARBITURATES 06/04/2017 NEGATIVE   NEG   Final    BENZODIAZEPINE 06/04/2017 NEGATIVE   NEG   Final    COCAINE 06/04/2017 NEGATIVE   NEG   Final    METHADONE 06/04/2017 NEGATIVE   NEG   Final    OPIATES 06/04/2017 NEGATIVE   NEG   Final    PCP(PHENCYCLIDINE) 06/04/2017 NEGATIVE   NEG   Final    THC (TH-CANNABINOL) 06/04/2017 POSITIVE* NEG   Final    Drug screen comment 06/04/2017 (NOTE)   Final    Color 06/04/2017 YELLOW/STRAW    Final    Appearance 06/04/2017 CLEAR  CLEAR   Final    Specific gravity 06/04/2017 <1.005  1.003 - 1.030 Final    pH (UA) 06/04/2017 6.0  5.0 - 8.0   Final    Protein 06/04/2017 NEGATIVE   NEG mg/dL Final    Glucose 06/04/2017 NEGATIVE   NEG mg/dL Final    Ketone 06/04/2017 NEGATIVE   NEG mg/dL Final    Bilirubin 06/04/2017 NEGATIVE   NEG   Final    Blood 06/04/2017 NEGATIVE   NEG   Final    Urobilinogen 06/04/2017 0.2  0.2 - 1.0 EU/dL Final    Nitrites 06/04/2017 NEGATIVE   NEG   Final    Leukocyte Esterase 06/04/2017 TRACE* NEG   Final    WBC 06/04/2017 0-4  0 - 4 /hpf Final    RBC 06/04/2017 0-5  0 - 5 /hpf Final    Epithelial cells 06/04/2017 FEW  FEW /lpf Final    Bacteria 06/04/2017 NEGATIVE   NEG /hpf Final    UA:UC IF INDICATED 06/04/2017 CULTURE NOT INDICATED BY UA RESULT  CNI   Final    Acetaminophen level 06/04/2017 <2* 10 - 30 ug/mL Final    SALICYLATE 46/18/5387 1.8* 2.8 - 20.0 MG/DL Final    ALCOHOL(ETHYL),SERUM 06/04/2017 <10  <10 MG/DL Final    HCG, Ql. 06/04/2017 NEGATIVE   NEG   Final    Sample type 06/04/2017 URINE    Final    Source 06/04/2017 URINE    Final    Chlamydia amplified 06/04/2017 POSITIVE* NEG   Final    N. gonorrhea, amplified 06/04/2017 NEGATIVE   NEG   Final    Comment 06/04/2017 Testing performed by the Roche Fidel CT/NG method, utilizing PCR amplification to identify DNA of the pathogens. This method is not recommended as the sole method of evaluation of cases of sexual abuse nor for other medico-legal indications. Final    Lithium level 06/08/2017 0.36* 0.60 - 1.20 MMOL/L Final    Reported dose date: 06/08/2017 NOT PROVIDED    Final    Reported dose time: 06/08/2017 NOT PROVIDED    Final    Reported dose: 06/08/2017 NOT PROVIDED  UNITS Final    LIPID PROFILE 06/08/2017        Final    Cholesterol, total 06/08/2017 146  <200 MG/DL Final    Triglyceride 06/08/2017 64  <150 MG/DL Final    HDL Cholesterol 06/08/2017 74  MG/DL Final    LDL, calculated 06/08/2017 59.2  0 - 100 MG/DL Final    VLDL, calculated 06/08/2017 12.8  MG/DL Final    CHOL/HDL Ratio 06/08/2017 2.0  0 - 5.0   Final    TSH 06/08/2017 2.98  0.36 - 3.74 uIU/mL Final     No results found. DISPOSITION:    [facility] .  Patient to f/u with drug/etoh rehabilitation, psychiatric, and psychotherapy appointments. Patient is to f/u with internist as directed. Patient should have a lithium level and associated labs checked within the next 1-2 weeks by patient's o/p psychiatrist/internist.               FOLLOW-UP CARE:    Activity as tolerated  Resume previous diet  Wound Care: none needed. Follow-up Information     Follow up With Details Comments Contact Info    None   None ([phone]) Patient stated that they have no PCP                   PROGNOSIS:   Good / Winda Duel---- based on nature of patient's pathology/ies and treatment compliance issues. Prognosis is greatly dependent upon patient's ability to remain sober and to follow up with drug/etoh rehabilitation and psychiatric/psychotherapy appointments as well as to comply with psychiatric medications as prescribed. DISCHARGE MEDICATIONS:     Informed consent given for the use of following psychotropic medications:  Current Discharge Medication List      START taking these medications    Details   lithium carbonate CR (ESKALITH CR) 450 mg CR tablet Take 2 Tabs by mouth nightly. Indications: BIPOLAR DISORDER  Qty: 14 Tab, Refills: 1      risperiDONE (RISPERDAL) 4 mg tablet Take 1 Tab by mouth nightly. Indications: Anne associated with Bipolar Disorder  Qty: 7 Tab, Refills: 1                    A coordinated, multidisplinary treatment team round was conducted with Abelardo Gaytan---this is done daily here at [facility]. This team consists of the nurse, psychiatric unit pharmcist,  and German Kennedy. I have spent greater than 35 minutes on discharge work.     Signed:  Magdy Ospina MD  6/8/2017 normal...
